# Patient Record
Sex: MALE | Race: WHITE | NOT HISPANIC OR LATINO | Employment: OTHER | URBAN - METROPOLITAN AREA
[De-identification: names, ages, dates, MRNs, and addresses within clinical notes are randomized per-mention and may not be internally consistent; named-entity substitution may affect disease eponyms.]

---

## 2017-01-23 DIAGNOSIS — I10 ESSENTIAL HYPERTENSION: ICD-10-CM

## 2017-01-23 RX ORDER — LISINOPRIL 40 MG/1
TABLET ORAL
Qty: 90 TAB | Refills: 3 | Status: SHIPPED | OUTPATIENT
Start: 2017-01-23 | End: 2018-01-29 | Stop reason: SDUPTHER

## 2017-03-21 ENCOUNTER — PATIENT OUTREACH (OUTPATIENT)
Dept: HEALTH INFORMATION MANAGEMENT | Facility: OTHER | Age: 72
End: 2017-03-21

## 2017-03-21 NOTE — PROGRESS NOTES
Attempt #:1    Verify PCP: yes    Communication Preference Obtained: yes     Annual Wellness Visit Scheduling  1. Scheduling Status:Scheduled     Care Gap Scheduling (Attempt to Schedule EACH Overdue Care Gap!)     Health Maintenance Due   Topic Date Due   • Annual Wellness Visit  SCHEDULED   • IMM DTaP/Tdap/Td Vaccine (1 - Tdap) WILL DISCUSS WITH PCP   • IMM PNEUMOCOCCAL 65+ (ADULT) LOW/MEDIUM RISK SERIES (2 of 2 - PCV13) WILL DISCUSS WITH PCP   • IMM INFLUENZA (1) WILL DISCUSS WITH PCP         MyChart Activation: declined  MyChart Lida: no  Virtual Visits: no  Opt In to Text Messages: no

## 2017-03-24 ENCOUNTER — OFFICE VISIT (OUTPATIENT)
Dept: MEDICAL GROUP | Facility: PHYSICIAN GROUP | Age: 72
End: 2017-03-24
Payer: MEDICARE

## 2017-03-24 ENCOUNTER — HOSPITAL ENCOUNTER (OUTPATIENT)
Dept: LAB | Facility: MEDICAL CENTER | Age: 72
End: 2017-03-24
Attending: INTERNAL MEDICINE
Payer: MEDICARE

## 2017-03-24 VITALS
HEIGHT: 70 IN | RESPIRATION RATE: 16 BRPM | WEIGHT: 185 LBS | OXYGEN SATURATION: 91 % | BODY MASS INDEX: 26.48 KG/M2 | HEART RATE: 78 BPM | DIASTOLIC BLOOD PRESSURE: 72 MMHG | TEMPERATURE: 97.5 F | SYSTOLIC BLOOD PRESSURE: 122 MMHG

## 2017-03-24 DIAGNOSIS — R53.82 CHRONIC FATIGUE: ICD-10-CM

## 2017-03-24 DIAGNOSIS — M54.40 LUMBAGO OF LUMBAR REGION WITH SCIATICA: ICD-10-CM

## 2017-03-24 DIAGNOSIS — E78.5 DYSLIPIDEMIA: ICD-10-CM

## 2017-03-24 DIAGNOSIS — F41.1 GENERALIZED ANXIETY DISORDER: Chronic | ICD-10-CM

## 2017-03-24 DIAGNOSIS — R10.84 GENERALIZED ABDOMINAL PAIN: ICD-10-CM

## 2017-03-24 DIAGNOSIS — E21.3 HYPERPARATHYROIDISM (HCC): ICD-10-CM

## 2017-03-24 DIAGNOSIS — E83.52 HYPERCALCEMIA: ICD-10-CM

## 2017-03-24 DIAGNOSIS — K21.00 GASTROESOPHAGEAL REFLUX DISEASE WITH ESOPHAGITIS: ICD-10-CM

## 2017-03-24 DIAGNOSIS — N20.0 KIDNEY STONES: ICD-10-CM

## 2017-03-24 LAB
25(OH)D3 SERPL-MCNC: 30 NG/ML (ref 30–100)
ALBUMIN SERPL BCP-MCNC: 4.9 G/DL (ref 3.2–4.9)
ALBUMIN/GLOB SERPL: 1.6 G/DL
ALP SERPL-CCNC: 97 U/L (ref 30–99)
ALT SERPL-CCNC: 22 U/L (ref 2–50)
ANION GAP SERPL CALC-SCNC: 4 MMOL/L (ref 0–11.9)
AST SERPL-CCNC: 22 U/L (ref 12–45)
BILIRUB SERPL-MCNC: 0.8 MG/DL (ref 0.1–1.5)
BUN SERPL-MCNC: 18 MG/DL (ref 8–22)
CALCIUM SERPL-MCNC: 10.6 MG/DL (ref 8.5–10.5)
CHLORIDE SERPL-SCNC: 106 MMOL/L (ref 96–112)
CHOLEST SERPL-MCNC: 151 MG/DL (ref 100–199)
CO2 SERPL-SCNC: 27 MMOL/L (ref 20–33)
CREAT SERPL-MCNC: 0.93 MG/DL (ref 0.5–1.4)
GLOBULIN SER CALC-MCNC: 3 G/DL (ref 1.9–3.5)
GLUCOSE SERPL-MCNC: 101 MG/DL (ref 65–99)
HDLC SERPL-MCNC: 37 MG/DL
LDLC SERPL CALC-MCNC: 95 MG/DL
POTASSIUM SERPL-SCNC: 4.3 MMOL/L (ref 3.6–5.5)
PROT SERPL-MCNC: 7.9 G/DL (ref 6–8.2)
SODIUM SERPL-SCNC: 137 MMOL/L (ref 135–145)
TRIGL SERPL-MCNC: 93 MG/DL (ref 0–149)

## 2017-03-24 PROCEDURE — 82306 VITAMIN D 25 HYDROXY: CPT | Mod: GA

## 2017-03-24 PROCEDURE — 4040F PNEUMOC VAC/ADMIN/RCVD: CPT | Performed by: NURSE PRACTITIONER

## 2017-03-24 PROCEDURE — 1101F PT FALLS ASSESS-DOCD LE1/YR: CPT | Performed by: NURSE PRACTITIONER

## 2017-03-24 PROCEDURE — 99214 OFFICE O/P EST MOD 30 MIN: CPT | Performed by: NURSE PRACTITIONER

## 2017-03-24 PROCEDURE — G8419 CALC BMI OUT NRM PARAM NOF/U: HCPCS | Performed by: NURSE PRACTITIONER

## 2017-03-24 PROCEDURE — 3017F COLORECTAL CA SCREEN DOC REV: CPT | Performed by: NURSE PRACTITIONER

## 2017-03-24 PROCEDURE — 80061 LIPID PANEL: CPT

## 2017-03-24 PROCEDURE — 1036F TOBACCO NON-USER: CPT | Performed by: NURSE PRACTITIONER

## 2017-03-24 PROCEDURE — 36415 COLL VENOUS BLD VENIPUNCTURE: CPT

## 2017-03-24 PROCEDURE — G8484 FLU IMMUNIZE NO ADMIN: HCPCS | Performed by: NURSE PRACTITIONER

## 2017-03-24 PROCEDURE — G8432 DEP SCR NOT DOC, RNG: HCPCS | Performed by: NURSE PRACTITIONER

## 2017-03-24 PROCEDURE — 80053 COMPREHEN METABOLIC PANEL: CPT

## 2017-03-24 RX ORDER — TERAZOSIN 5 MG/1
5 CAPSULE ORAL DAILY
Qty: 90 CAP | Refills: 3 | Status: SHIPPED | OUTPATIENT
Start: 2017-03-24 | End: 2018-04-03 | Stop reason: SDUPTHER

## 2017-03-24 RX ORDER — OMEPRAZOLE 20 MG/1
20 CAPSULE, DELAYED RELEASE ORAL DAILY
Qty: 90 CAP | Refills: 1 | Status: SHIPPED | OUTPATIENT
Start: 2017-03-24 | End: 2019-06-06 | Stop reason: CLARIF

## 2017-03-24 ASSESSMENT — PAIN SCALES - GENERAL: PAINLEVEL: NO PAIN

## 2017-03-24 NOTE — PATIENT INSTRUCTIONS
Start omeprazole, take on empty stomach in the morning 1/2 hour before breakfast    Get labs done before you follow up with me in 6 weeks    Avoid aggravating foods    Food Choices for Gastroesophageal Reflux Disease, Adult  When you have gastroesophageal reflux disease (GERD), the foods you eat and your eating habits are very important. Choosing the right foods can help ease the discomfort of GERD.  WHAT GENERAL GUIDELINES DO I NEED TO FOLLOW?  · Choose fruits, vegetables, whole grains, low-fat dairy products, and low-fat meat, fish, and poultry.  · Limit fats such as oils, salad dressings, butter, nuts, and avocado.  · Keep a food diary to identify foods that cause symptoms.  · Avoid foods that cause reflux. These may be different for different people.  · Eat frequent small meals instead of three large meals each day.  · Eat your meals slowly, in a relaxed setting.  · Limit fried foods.  · Cook foods using methods other than frying.  · Avoid drinking alcohol.  · Avoid drinking large amounts of liquids with your meals.  · Avoid bending over or lying down until 2-3 hours after eating.  WHAT FOODS ARE NOT RECOMMENDED?  The following are some foods and drinks that may worsen your symptoms:  Vegetables  Tomatoes. Tomato juice. Tomato and spaghetti sauce. Chili peppers. Onion and garlic. Horseradish.  Fruits  Oranges, grapefruit, and lemon (fruit and juice).  Meats  High-fat meats, fish, and poultry. This includes hot dogs, ribs, ham, sausage, salami, and lopez.  Dairy  Whole milk and chocolate milk. Sour cream. Cream. Butter. Ice cream. Cream cheese.   Beverages  Coffee and tea, with or without caffeine. Carbonated beverages or energy drinks.  Condiments  Hot sauce. Barbecue sauce.   Sweets/Desserts  Chocolate and cocoa. Donuts. Peppermint and spearmint.  Fats and Oils  High-fat foods, including French fries and potato chips.  Other  Vinegar. Strong spices, such as black pepper, white pepper, red pepper, cayenne, veronica  powder, cloves, arti, and chili powder.  The items listed above may not be a complete list of foods and beverages to avoid. Contact your dietitian for more information.     This information is not intended to replace advice given to you by your health care provider. Make sure you discuss any questions you have with your health care provider.     Document Released: 12/18/2006 Document Revised: 01/08/2016 Document Reviewed: 10/22/2014  [employer] Interactive Patient Education ©2016 [employer]

## 2017-03-24 NOTE — ASSESSMENT & PLAN NOTE
Patient does have history of hypercalcemia and parathyroid adenoma removal. We will monitor this regularly, and he is due for labs. These have been ordered and he will have done either today or on Monday. I will call him with results and further actions if needed.

## 2017-03-24 NOTE — MR AVS SNAPSHOT
"        Celio Elamcarline   3/24/2017 9:40 AM   Office Visit   MRN: [id number]    Department:  [facility]   Dept Phone:  [phone]    Description:  Male : 1945   Provider:  JANNY Pedraza           Reason for Visit     New Patient est care and lab results.       Allergies as of 3/24/2017     Allergen Noted Reactions    Xanax [Alprazolam] 2012       confusion      You were diagnosed with     Lumbago of lumbar region with sciatica   [0259480]       Hypercalcemia   [275.42.ICD-9-CM]       Generalized anxiety disorder   [300.02.ICD-9-CM]       Hyperparathyroidism (CMS-HCC)   [2719983]       Generalized abdominal pain   [290210]       Gastroesophageal reflux disease with esophagitis   [2733326]       Kidney stones   [673084]         Vital Signs     Blood Pressure Pulse Temperature Respirations Height Weight    122/72 mmHg 78 36.4 °C (97.5 °F) 16 1.778 m (5' 10\") 83.915 kg (185 lb)    Body Mass Index Oxygen Saturation Smoking Status             26.54 kg/m2 91% Former Smoker         Basic Information     Date Of Birth Sex Race Ethnicity Preferred Language    1945 Male White Non- English      Your appointments     Mar 27, 2017 10:40 AM   ANNUAL EXAM PREVENTATIVE with JANNY Davalos   [facility] (--)    [address] [address] [phone]            May 10, 2017  9:00 AM   Established Patient with JANNY Pedraza   [address] [address] [phone]           You will be receiving a confirmation call a few days before your appointment from our automated call confirmation system.              Problem List              ICD-10-CM Priority Class Noted - Resolved    Generalized anxiety disorder (Chronic) F41.1   11/10/2009 - Present    HTN (hypertension) I10   2010 - Present    Depression F32.9   2010 - Present    Chronic back pain M54.9, " G89.29   7/8/2010 - Present    Murmur, cardiac R01.1   11/4/2010 - Present    Osteopenia M85.80   3/28/2011 - Present    Seasonal allergies J30.2   5/5/2011 - Present    Kidney stones N20.0   3/26/2012 - Present    RBBB I45.10   6/20/2012 - Present    Hyperparathyroidism (CMS-[facility]) E21.3   7/2/2012 - Present    Swelling of right knee joint M25.461   6/4/2013 - Present    SOB (shortness of breath) R06.02   10/24/2014 - Present    Anemia D64.9   4/17/2015 - Present    Gastroesophageal reflux disease with esophagitis K21.0   2/1/2016 - Present    Colon polyps K63.5   2/1/2016 - Present    Lumbago of lumbar region with sciatica M54.40   3/16/2016 - Present    Grief at loss of child F43.21, Z63.4   3/16/2016 - Present    Hypercalcemia E83.52   3/16/2016 - Present    Weight loss R63.4   4/8/2016 - Present    Generalized abdominal pain R10.84   3/24/2017 - Present      Health Maintenance        Date Due Completion Dates    IMM DTaP/Tdap/Td Vaccine (1 - Tdap) 4/10/1963 ---    IMM PNEUMOCOCCAL 65+ (ADULT) LOW/MEDIUM RISK SERIES (2 of 2 - PCV13) 10/24/2015 10/24/2014    IMM INFLUENZA (1) 9/1/2016 10/24/2014    COLONOSCOPY 6/8/2026 6/8/2016            Current Immunizations     Influenza Vaccine Quad Inj (Pf) 10/24/2014 12:34 PM    Pneumococcal polysaccharide vaccine (PPSV-23) 10/24/2014 12:37 PM    SHINGLES VACCINE 5/4/2016      Below and/or attached are the medications your provider expects you to take. Review all of your home medications and newly ordered medications with your provider and/or pharmacist. Follow medication instructions as directed by your provider and/or pharmacist. Please keep your medication list with you and share with your provider. Update the information when medications are discontinued, doses are changed, or new medications (including over-the-counter products) are added; and carry medication information at all times in the event of emergency situations     Allergies:  XANAX - (reactions not documented)                Medications  Valid as of: March 24, 2017 - 10:41 AM    Generic Name Brand Name Tablet Size Instructions for use    Lisinopril (Tab) PRINIVIL, ZESTRIL 40 MG TAKE 1 TABLET BY MOUTH DAILY.        Omeprazole (CAPSULE DELAYED RELEASE) PRILOSEC 20 MG Take 1 Cap by mouth every day.        Sertraline HCl (Tab) ZOLOFT 100 MG Take 2 Tabs by mouth every day.        Terazosin HCl (Cap) HYTRIN 5 MG Take 1 Cap by mouth every day.        .                 Medicines prescribed today were sent to:     [id number] - [place], [place] - [address]    [address] [place] [address]    Phone: [phone] Fax: [phone]    Open 24 Hours?: No      Medication refill instructions:       If your prescription bottle indicates you have medication refills left, it is not necessary to call your provider’s office. Please contact your pharmacy and they will refill your medication.    If your prescription bottle indicates you do not have any refills left, you may request refills at any time through one of the following ways: The online [employer] system (except Urgent Care), by calling your provider’s office, or by asking your pharmacy to contact your provider’s office with a refill request. Medication refills are processed only during regular business hours and may not be available until the next business day. Your provider may request additional information or to have a follow-up visit with you prior to refilling your medication.   *Please Note: Medication refills are assigned a new Rx number when refilled electronically. Your pharmacy may indicate that no refills were authorized even though a new prescription for the same medication is available at the pharmacy. Please request the medicine by name with the pharmacy before contacting your provider for a refill.        Instructions    Start omeprazole, take on empty stomach in the morning 1/2 hour before breakfast    Get labs done before you follow up with me  in 6 weeks    Avoid aggravating foods    Food Choices for Gastroesophageal Reflux Disease, Adult  When you have gastroesophageal reflux disease (GERD), the foods you eat and your eating habits are very important. Choosing the right foods can help ease the discomfort of GERD.  WHAT GENERAL GUIDELINES DO I NEED TO FOLLOW?  · Choose fruits, vegetables, whole grains, low-fat dairy products, and low-fat meat, fish, and poultry.  · Limit fats such as oils, salad dressings, butter, nuts, and avocado.  · Keep a food diary to identify foods that cause symptoms.  · Avoid foods that cause reflux. These may be different for different people.  · Eat frequent small meals instead of three large meals each day.  · Eat your meals slowly, in a relaxed setting.  · Limit fried foods.  · Cook foods using methods other than frying.  · Avoid drinking alcohol.  · Avoid drinking large amounts of liquids with your meals.  · Avoid bending over or lying down until 2-3 hours after eating.  WHAT FOODS ARE NOT RECOMMENDED?  The following are some foods and drinks that may worsen your symptoms:  Vegetables  Tomatoes. Tomato juice. Tomato and spaghetti sauce. Chili peppers. Onion and garlic. Horseradish.  Fruits  Oranges, grapefruit, and lemon (fruit and juice).  Meats  High-fat meats, fish, and poultry. This includes hot dogs, ribs, ham, sausage, salami, and lopez.  Dairy  Whole milk and chocolate milk. Sour cream. Cream. Butter. Ice cream. Cream cheese.   Beverages  Coffee and tea, with or without caffeine. Carbonated beverages or energy drinks.  Condiments  Hot sauce. Barbecue sauce.   Sweets/Desserts  Chocolate and cocoa. Donuts. Peppermint and spearmint.  Fats and Oils  High-fat foods, including French fries and potato chips.  Other  Vinegar. Strong spices, such as black pepper, white pepper, red pepper, cayenne, veronica powder, cloves, ginger, and chili powder.  The items listed above may not be a complete list of foods and beverages to avoid.  Contact your dietitian for more information.     This information is not intended to replace advice given to you by your health care provider. Make sure you discuss any questions you have with your health care provider.     Document Released: 12/18/2006 Document Revised: 01/08/2016 Document Reviewed: 10/22/2014  [employer] Interactive Patient Education ©2016 [employer]             Other Notes About Your Plan     Dr. Cruz-Urology  Dr. Vásquez-Neuro           Picwing Access Code: [email or web address]  Expires: 4/23/2017 10:37 AM    Picwing  A secure, online tool to manage your health information     [employer]’s Picwing® is a secure, online tool that connects you to your personalized health information from the privacy of your home -- day or night - making it very easy for you to manage your healthcare. Once the activation process is completed, you can even access your medical information using the Picwing lida, which is available for free in the [employer] Lida store or Google Play store.     Picwing provides the following levels of access (as shown below):   My Chart Features   [facility] Doctor [facility]  Specialists [facility]  Urgent  Care Non-[facility]  Primary Care  Doctor   Email your healthcare team securely and privately 24/7 X X X    Manage appointments: schedule your next appointment; view details of past/upcoming appointments X      Request prescription refills. X      View recent personal medical records, including lab and immunizations X X X X   View health record, including health history, allergies, medications X X X X   Read reports about your outpatient visits, procedures, consult and ER notes X X X X   See your discharge summary, which is a recap of your hospital and/or ER visit that includes your diagnosis, lab results, and care plan. X X       How to register for Picwing:  1. Go to  [email or web address].  2. Click on the Sign Up Now box, which takes you to the New Member Sign Up page. You will need  to provide the following information:  a. Enter your [employer] Access Code exactly as it appears at the top of this page. (You will not need to use this code after you’ve completed the sign-up process. If you do not sign up before the expiration date, you must request a new code.)   b. Enter your date of birth.   c. Enter your home email address.   d. Click Submit, and follow the next screen’s instructions.  3. Create a [employer] ID. This will be your [employer] login ID and cannot be changed, so think of one that is secure and easy to remember.  4. Create a [employer] password. You can change your password at any time.  5. Enter your Password Reset Question and Answer. This can be used at a later time if you forget your password.   6. Enter your e-mail address. This allows you to receive e-mail notifications when new information is available in [employer].  7. Click Sign Up. You can now view your health information.    For assistance activating your [employer] account, call [phone]

## 2017-03-24 NOTE — ASSESSMENT & PLAN NOTE
Acute in nature, started about 2-3 weeks ago. Located in the epigastric area and sometimes around the umbilicus. He is have regular BMs, no blood in stool, no nausea or vomiting, no heartburn. He cannot pinpoint what aggravates it or makes it better, notices it more in the evening. He feels that it comes and goes.   I did discuss with him that generalized abdominal pain can be caused by many things, but his symptoms are likely caused from GERD. I reminded him that he was treated for this last year with omeprazole. He admits to never picking up the prescription and taking it. I encouraged him to start taking this medication, I will call in new prescription. I also gave him list of foods and activities that he should avoid, printed educational material was given to him. I will see him back and 6 weeks for follow-up.

## 2017-03-24 NOTE — ASSESSMENT & PLAN NOTE
This is chronic in nature, stable. At his last visit he was referred to back to his back surgeon but admits to not making the appointment. He states overall his back pain has improved.

## 2017-03-24 NOTE — PROGRESS NOTES
Chief Complaint   Patient presents with   • New Patient     est care and lab results.          This is a 71 y.o.male patient that presents today with the following: Follow-up visit, review labs--however patient did not have these done, he will have them done either today or sometime next week.    Generalized abdominal pain  Acute in nature, started about 2-3 weeks ago. Located in the epigastric area and sometimes around the umbilicus. He is have regular BMs, no blood in stool, no nausea or vomiting, no heartburn. He cannot pinpoint what aggravates it or makes it better, notices it more in the evening. He feels that it comes and goes.   I did discuss with him that generalized abdominal pain can be caused by many things, but his symptoms are likely caused from GERD. I reminded him that he was treated for this last year with omeprazole. He admits to never picking up the prescription and taking it. I encouraged him to start taking this medication, I will call in new prescription. I also gave him list of foods and activities that he should avoid, printed educational material was given to him. I will see him back and 6 weeks for follow-up.      Generalized Anxiety Disorder  This is a chronic condition, stable and well controlled on sertraline, 200 mg daily. He tolerates this well with no significant bothersome side effects. He does not need refills at this time, but can call when needed. He denies suicidal and homicidal ideations, racing thoughts and flights of ideas. We will monitor this every 6 months and as needed.    Kidney stones  Patient does have past history of kidney stones for which she takes terazosin 5 mg daily. He tolerates this well with no significant or bothersome side effects. He does not report recent history of kidney stones. He does need refills, this was called in for him.    Hypercalcemia  Patient does have history of hypercalcemia and parathyroid adenoma removal. We will monitor this regularly, and  "he is due for labs. These have been ordered and he will have done either today or on Monday. I will call him with results and further actions if needed.    Lumbago of lumbar region with sciatica  This is chronic in nature, stable. At his last visit he was referred to back to his back surgeon but admits to not making the appointment. He states overall his back pain has improved.      No visits with results within 1 Month(s) from this visit.  Latest known visit with results is:    Hospital Outpatient Visit on 04/26/2016   Component Date Value   • Prostatic Specific Antig* 04/26/2016 0.81          clinical course has been stable    Past Medical History   Diagnosis Date   • Essential hypertension, malignant    • Hypertrophy of prostate with urinary obstruction and other lower urinary tract symptoms (LUTS)    • Insomnia, unspecified    • Right bundle branch block and left anterior fascicular block    • Ingrowing nail      RT GREAT TOE   • Degeneration of lumbar or lumbosacral intervertebral disc    • Allergic rhinitis, cause unspecified    • Unspecified deficiency anemia    • Depressive disorder, not elsewhere classified    • Anxiety state, unspecified    • Esophageal reflux    • Generalized anxiety disorder 11/10/2009   • Unspecified hemorrhagic conditions (CMS-HCC)      nose bleeds   • History of kidney stones    • SOB (shortness of breath) 10/24/2014       Past Surgical History   Procedure Laterality Date   • Lumbar laminectomy diskectomy  2009   • Cervical disk and fusion anterior  2009   • Lithotripsy       \"several years ago\"   • Inguinal hernia repair       left   • Parathyroid exploration  7/25/2012     Performed by GURVINDER KENDALL at SURGERY SAME DAY [facility] ORS       Family History   Problem Relation Age of Onset   • Cancer Mother      LIVER       Xanax    Current Outpatient Prescriptions Ordered in [facility]   Medication Sig Dispense Refill   • omeprazole (PRILOSEC) 20 MG delayed-release capsule Take 1 Cap by " "mouth every day. 90 Cap 1   • terazosin (HYTRIN) 5 MG Cap Take 1 Cap by mouth every day. 90 Cap 3   • lisinopril (PRINIVIL, ZESTRIL) 40 MG tablet TAKE 1 TABLET BY MOUTH DAILY. 90 Tab 3   • sertraline (ZOLOFT) 100 MG Tab Take 2 Tabs by mouth every day. 180 Tab 3     No current Epic-ordered facility-administered medications on file.       Constitutional ROS: No unexpected change in weight, No weakness, No unexplained fevers, sweats, or chills  Neck ROS: No lumps or masses, No swollen glands, No significant pain in neck  Pulmonary ROS: No chronic cough, sputum, or hemoptysis, No shortness of breath, No recent change in breathing  Cardiovascular ROS: No chest pain, No edema, No palpitations  Gastrointestinal ROS: Positive per history of present illness  Musculoskeletal/Extremities ROS: Positive for chronic low back pain  Neurologic ROS: Normal development, No chronic headaches, No weakness  Psychiatric ROS: Positive for anxiety and depression    Physical exam:  /72 mmHg  Pulse 78  Temp(Src) 36.4 °C (97.5 °F)  Resp 16  Ht 1.778 m (5' 10\")  Wt 83.915 kg (185 lb)  BMI 26.54 kg/m2  SpO2 91%  General Appearance: Elderly male, alert, no distress, mildly overweight, well-groomed  Skin: Skin color, texture, turgor normal. No rashes or lesions.  Lungs: negative findings: normal respiratory rate and rhythm, lungs clear to auscultation  Heart: negative. RRR without murmur, gallop, or rubs.  No ectopy.  Abdomen: Abdomen soft, BS normal. No masses,  No organomegaly. Mild TTP to epigastric area  Musculoskeletal: positive findings: Decreased range of motion of lumbar spine due to pain  Neurologic: intact, oriented, mood appropriate, judgment intact. Cranial nerves II-12 grossly intact    Medical decision making/discussion: Patient here for follow-up visit and to review labs. Unfortunately he did not have his labs done, so he will have them done either today or sometime next week. I will call him with results and further " actions if needed. He is also to follow-up with me in 6 weeks, we can discuss at that time as well. I will have him restart omeprazole for his epigastric pain and avoid aggravating foods and activities--educational material was given to him. He is to continue on his other medications as previously prescribed.    Celio was seen today for new patient.    Diagnoses and all orders for this visit:    Lumbago of lumbar region with sciatica    Hypercalcemia    Generalized anxiety disorder    Hyperparathyroidism (CMS-HCC)    Generalized abdominal pain    Gastroesophageal reflux disease with esophagitis  -     omeprazole (PRILOSEC) 20 MG delayed-release capsule; Take 1 Cap by mouth every day.    Kidney stones  -     terazosin (HYTRIN) 5 MG Cap; Take 1 Cap by mouth every day.          Please note that this dictation was created using voice recognition software. I have made every reasonable attempt to correct obvious errors, but I expect that there are errors of grammar and possibly content that I did not discover before finalizing the note.

## 2017-03-24 NOTE — ASSESSMENT & PLAN NOTE
Patient does have past history of kidney stones for which she takes terazosin 5 mg daily. He tolerates this well with no significant or bothersome side effects. He does not report recent history of kidney stones. He does need refills, this was called in for him.

## 2017-03-24 NOTE — ASSESSMENT & PLAN NOTE
This is a chronic condition, stable and well controlled on sertraline, 200 mg daily. He tolerates this well with no significant bothersome side effects. He does not need refills at this time, but can call when needed. He denies suicidal and homicidal ideations, racing thoughts and flights of ideas. We will monitor this every 6 months and as needed.

## 2017-03-27 ENCOUNTER — OFFICE VISIT (OUTPATIENT)
Dept: MEDICAL GROUP | Facility: PHYSICIAN GROUP | Age: 72
End: 2017-03-27
Payer: MEDICARE

## 2017-03-27 VITALS
DIASTOLIC BLOOD PRESSURE: 78 MMHG | OXYGEN SATURATION: 93 % | WEIGHT: 188.5 LBS | BODY MASS INDEX: 26.99 KG/M2 | TEMPERATURE: 97.5 F | SYSTOLIC BLOOD PRESSURE: 122 MMHG | HEART RATE: 89 BPM | HEIGHT: 70 IN

## 2017-03-27 DIAGNOSIS — I45.10 RBBB: ICD-10-CM

## 2017-03-27 DIAGNOSIS — Z63.4 GRIEF AT LOSS OF CHILD: ICD-10-CM

## 2017-03-27 DIAGNOSIS — F41.1 GENERALIZED ANXIETY DISORDER: Chronic | ICD-10-CM

## 2017-03-27 DIAGNOSIS — F43.21 GRIEF AT LOSS OF CHILD: ICD-10-CM

## 2017-03-27 DIAGNOSIS — M85.80 OSTEOPENIA: ICD-10-CM

## 2017-03-27 DIAGNOSIS — M25.461 SWELLING OF RIGHT KNEE JOINT: ICD-10-CM

## 2017-03-27 DIAGNOSIS — E21.3 HYPERPARATHYROIDISM (HCC): ICD-10-CM

## 2017-03-27 DIAGNOSIS — K21.00 GASTROESOPHAGEAL REFLUX DISEASE WITH ESOPHAGITIS: ICD-10-CM

## 2017-03-27 DIAGNOSIS — K63.5 COLON POLYPS: ICD-10-CM

## 2017-03-27 DIAGNOSIS — E83.52 HYPERCALCEMIA: ICD-10-CM

## 2017-03-27 DIAGNOSIS — R63.4 WEIGHT LOSS: ICD-10-CM

## 2017-03-27 DIAGNOSIS — R01.1 MURMUR, CARDIAC: ICD-10-CM

## 2017-03-27 DIAGNOSIS — N20.0 KIDNEY STONES: ICD-10-CM

## 2017-03-27 DIAGNOSIS — M54.40 LUMBAGO OF LUMBAR REGION WITH SCIATICA: ICD-10-CM

## 2017-03-27 DIAGNOSIS — R06.02 SOB (SHORTNESS OF BREATH): ICD-10-CM

## 2017-03-27 DIAGNOSIS — Z00.00 MEDICARE ANNUAL WELLNESS VISIT, INITIAL: Primary | ICD-10-CM

## 2017-03-27 PROCEDURE — G0438 PPPS, INITIAL VISIT: HCPCS | Performed by: NURSE PRACTITIONER

## 2017-03-27 PROCEDURE — G8510 SCR DEP NEG, NO PLAN REQD: HCPCS | Performed by: NURSE PRACTITIONER

## 2017-03-27 PROCEDURE — 1036F TOBACCO NON-USER: CPT | Performed by: NURSE PRACTITIONER

## 2017-03-27 SDOH — SOCIAL STABILITY - SOCIAL INSECURITY: DISSAPEARANCE AND DEATH OF FAMILY MEMBER: Z63.4

## 2017-03-27 ASSESSMENT — PATIENT HEALTH QUESTIONNAIRE - PHQ9
5. POOR APPETITE OR OVEREATING: 3 - NEARLY EVERY DAY
SUM OF ALL RESPONSES TO PHQ QUESTIONS 1-9: 2
CLINICAL INTERPRETATION OF PHQ2 SCORE: 2

## 2017-03-27 NOTE — PROGRESS NOTES
CC:   Medicare Annual Wellness Visit    HPI:  Celio is a 71 y.o. here for Medicare Annual Wellness Visit    Patient Active Problem List    Diagnosis Date Noted   • Generalized abdominal pain 03/24/2017   • Weight loss 04/08/2016   • Lumbago of lumbar region with sciatica 03/16/2016   • Grief at loss of child 03/16/2016   • Hypercalcemia 03/16/2016   • Gastroesophageal reflux disease with esophagitis 02/01/2016   • Colon polyps 02/01/2016   • Anemia 04/17/2015   • SOB (shortness of breath) 10/24/2014   • Hyperparathyroidism (CMS-HCC) 07/02/2012   • RBBB 06/20/2012   • Kidney stones 03/26/2012   • Seasonal allergies 05/05/2011   • Osteopenia 03/28/2011   • Murmur, cardiac 11/04/2010   • Depression 07/08/2010   • Chronic back pain 07/08/2010   • HTN (hypertension) 06/30/2010   • Generalized anxiety disorder 11/10/2009     Current Outpatient Prescriptions   Medication Sig Dispense Refill   • Multiple Vitamins-Minerals (CENTRUM SILVER ADULT 50+ PO) Take  by mouth.     • omeprazole (PRILOSEC) 20 MG delayed-release capsule Take 1 Cap by mouth every day. 90 Cap 1   • terazosin (HYTRIN) 5 MG Cap Take 1 Cap by mouth every day. 90 Cap 3   • lisinopril (PRINIVIL, ZESTRIL) 40 MG tablet TAKE 1 TABLET BY MOUTH DAILY. 90 Tab 3   • sertraline (ZOLOFT) 100 MG Tab Take 2 Tabs by mouth every day. 180 Tab 3     No current facility-administered medications for this visit.      Current supplements: no  Chronic narcotic pain medicines: no  Allergies: Xanax  Exercise: as maurice  Current social contact/activities: Has support of family and friends      Screening:  Depression Screening    Little interest or pleasure in doing things?  0 - not at all  Feeling down, depressed , or hopeless? 2 - more than half the days  Trouble falling or staying asleep, or sleeping too much?  3 - nearly every day  Feeling tired or having little energy?  2 - more than half the days  Poor appetite or overeating?  3 - nearly every day  Feeling bad about yourself -  or that you are a failure or have let yourself or your family down? 1 - several days  Trouble concentrating on things, such as reading the newspaper or watching television? 0 - not at all  Moving or speaking so slowly that other people could have noticed.  Or the opposite - being so fidgety or restless that you have been moving around a lot more than usual?  2 - more than half the days  Thoughts that you would be better off dead, or of hurting yourself?  0 - not at all  Patient Health Questionnaire Score: 2  If depressive symptoms identified deferred to follow up visit unless specifically addressed in assessment and plan.      Screening for Cognitive Impairment    Three Minute Recall (banana, sunrise, fence)  3/3    Draw clock face with all 12 numbers set to the hand to show 10 minures past 11 o'clock  1 5  If cognitive concerns identified deferred to follow up visit unless specifically addressed in assessment and plan.    Fall Risk Assessment    Has the patient had two or more falls in the last year or any fall with injury in the last year?  No  If Fall Risk identified deferred to follow up visit unless specifically addressed in assessment and plan.    Safety Assessment    Throw rugs on floor.  Yes  Handrails on all stairs.  Yes  Good lighting in all hallways.  Yes  Difficulty hearing.  No  Patient counseled about all safety risks that were identified.    Functional Assessment ADLs    Are there any barriers preventing you from cooking for yourself or meeting nutritional needs?  No.    Are there any barriers preventing you from driving safely or obtaining transportation?  No.    Are there any barriers preventing you from using a telephone or calling for help?  No.    Are there any barriers preventing you from shopping?  No.     Are there any barriers preventing you from taking care of your own finances?  Yes. Daughter manages finances.    Are there any barriers preventing you from managing your medications?  No.   "  Are currently engaing any exercise or physical activity?  Yes.       Health Maintenance Summary                Annual Wellness Visit Overdue 1945     IMM DTaP/Tdap/Td Vaccine Overdue 4/10/1963     IMM PNEUMOCOCCAL 65+ (ADULT) LOW/MEDIUM RISK SERIES Overdue 10/24/2015      Done 10/24/2014 Imm Admin: Pneumococcal polysaccharide vaccine (PPSV-23)    IMM INFLUENZA Overdue 9/1/2016      Done 10/24/2014 Imm Admin: Influenza Vaccine Quad Inj (Pf)    COLONOSCOPY Next Due 6/8/2026      Done 6/8/2016 AMB REFERRAL TO GI FOR COLONOSCOPY          Patient Care Team:  JANNY Pedraza as PCP - General (Family Medicine)  Familia Cruz M.D. as Consulting Physician (Urology)  Saurabh Vásquez M.D. as Consulting Physician (Neurosurgery)        Social History   Substance Use Topics   • Smoking status: Former Smoker -- 0.50 packs/day for 30 years     Types: Cigarettes     Quit date: 03/27/1987   • Smokeless tobacco: Never Used   • Alcohol Use: No       Family History   Problem Relation Age of Onset   • Cancer Mother      LIVER   • No Known Problems Father      He  has a past medical history of Essential hypertension, malignant; Hypertrophy of prostate with urinary obstruction and other lower urinary tract symptoms (LUTS); Insomnia, unspecified; Right bundle branch block and left anterior fascicular block; Ingrowing nail; Degeneration of lumbar or lumbosacral intervertebral disc; Allergic rhinitis, cause unspecified; Unspecified deficiency anemia; Depressive disorder, not elsewhere classified; Anxiety state, unspecified; Esophageal reflux; Generalized anxiety disorder (11/10/2009); Unspecified hemorrhagic conditions (CMS-HCC); History of kidney stones; and SOB (shortness of breath) (10/24/2014).   Past Surgical History   Procedure Laterality Date   • Lumbar laminectomy diskectomy  2009   • Cervical disk and fusion anterior  2009   • Lithotripsy       \"several years ago\"   • Inguinal hernia repair       left   • " "Parathyroid exploration  7/25/2012     Performed by GURVINDER KENDALL at SURGERY SAME DAY [facility]S       ROS:    Ostomy or other tubes or amputations: no  Chronic oxygen use no  Last eye exam 2/2017  : Denies incontinence.   Gait: Uses no assistive device   Hearing excellent.    Dentition good    Exam: Blood pressure 122/78, pulse 89, temperature 36.4 °C (97.5 °F), height 1.765 m (5' 9.5\"), weight 85.503 kg (188 lb 8 oz), SpO2 93 %. Body mass index is 27.45 kg/(m^2).  Alert, oriented in no acute distress.  Eye contact is good, speech goal directed, affect calm      Assessment and Plan. The following treatment and monitoring plan is recommended for all problems as listed below:   Weight loss  Today wgt 285, prior 188  Followed by JANNY Pedraza.     Seasonal allergies  Chronic condition managed with current medical regimen  Stable per review   Continue with OTC prn meds  Followed by VIJAY PedrazaRSWATHI.        Swelling of right knee joint  resolved    SOB (shortness of breath)  Chronic condition managed with current medical regimen  Stable per review, rare and intermit   Continue with current meds  Followed by JANNY Pedraza.        RBBB  Followed by JANNY Pedraza.     Osteopenia  Chronic condition managed with current medical regimen  Stable per review   Continue with current OTC meds  Followed by JANNY Pedraza.        Murmur, cardiac  Followed by JANNY Pedraza.     Lumbago of lumbar region with sciatica  Chronic condition managed with current medical regimen  Stable per review   Continue with surviellance  Followed by JANNY Pedraza.        Hyperparathyroidism  2/2016   Pth, Intact 64.3 14.0 - 72.0 pg/mL Final       States had surg in past, unsure if all 4 was removed  Followed by VIJAY PedrazaRSWATHI.          HTN (hypertension)  /78  Chronic condition managed with current medical regimen  Stable per review   " Continue with current meds  Followed by JANNY Pedraza.        Kidney stones  Chronic condition managed with current medical regimen  Stable per review, last event ~ 2yrs ago   Continue with current meds  Followed by urologist       Hypercalcemia  3/24/2017   Calcium 10.6 (H) 8.5 - 10.5 mg/dL Final     Followed by JANNY Pedraza.                   Grief at loss of child  Lost son, suicide, cont to deal with his loss   Continue with current meds  Followed by JANNY Pedraza.     Generalized Anxiety Disorder  Chronic condition managed with current medical regimen  Stable per review   Continue with surveillance  Followed by JANNY Pedraza.        Gastroesophageal reflux disease with esophagitis  Chronic condition managed with current medical regimen  Stable per review   Continue with current meds  Followed by JANNY Pedraza.        Depression  Lost son, suicide, cont to deal with his loss   Continue with current meds  Followed by JANNY Pedraza.       Colon polyps  Neg path    Chronic back pain  Chronic condition managed with current medical regimen  Stable per review   Continue with surveillance  Followed by JANNY Pedraza.        Anemia  4/13/2015   RBC 4.33 (L) 4.70 - 6.10 M/uL Final      Hemoglobin 13.4 (L) 14.0 - 18.0 g/dL Final     Hematocrit 40.7 (L) 42.0 - 52.0 % Final     MCV 94.0 81.4 - 97.8 fL Final     MCH 30.9 27.0 - 33.0 pg Final     MCHC 32.9 (L) 33.7 - 35.3 g/dL Final     RDW 48.9 35.9 - 50.0 fL Final     Platelet Count 139 (L) 164 - 446 K/uL Final     Chronic condition managed with current medical regimen  Stable per review   Continue with current otc meds  Followed by JANNY Pedraza.            Health Care Screening recommendations reviewed with patient today: per Patient Instructions  DPA/Advanced directive: .has an advanced directive - recom a copy be provided    Next office visit for recheck of chronic  medical conditions is due with JANNY Pedraza 5/10/2017

## 2017-03-27 NOTE — ASSESSMENT & PLAN NOTE
4/13/2015   RBC 4.33 (L) 4.70 - 6.10 M/uL Final      Hemoglobin 13.4 (L) 14.0 - 18.0 g/dL Final     Hematocrit 40.7 (L) 42.0 - 52.0 % Final     MCV 94.0 81.4 - 97.8 fL Final     MCH 30.9 27.0 - 33.0 pg Final     MCHC 32.9 (L) 33.7 - 35.3 g/dL Final     RDW 48.9 35.9 - 50.0 fL Final     Platelet Count 139 (L) 164 - 446 K/uL Final     Chronic condition managed with current medical regimen  Stable per review   Continue with current otc meds  Followed by JANNY Pedraza.

## 2017-03-27 NOTE — ASSESSMENT & PLAN NOTE
Chronic condition managed with current medical regimen  Stable per review, rare and intermit   Continue with current meds  Followed by JANNY Pedraza.

## 2017-03-27 NOTE — ASSESSMENT & PLAN NOTE
Chronic condition managed with current medical regimen  Stable per review, last event ~ 2yrs ago   Continue with current meds  Followed by urologist

## 2017-03-27 NOTE — MR AVS SNAPSHOT
"        Celio Carringtondeborah   3/27/2017 10:40 AM   Office Visit   MRN: [id number]    Department:  [facility]   Dept Phone:  [phone]    Description:  Male : 1945   Provider:  JANNY Davalos           Reason for Visit     Annual Exam initial      Allergies as of 3/27/2017     Allergen Noted Reactions    Xanax [Alprazolam] 2012       confusion      You were diagnosed with     Medicare annual wellness visit, initial   [910598]  -  Primary     Weight loss   [341697]       Swelling of right knee joint   [595093]       SOB (shortness of breath)   [869815]       RBBB   [320823]       Osteopenia   [342408]       Murmur, cardiac   [392772]       Lumbago of lumbar region with sciatica   [4181594]       Hyperparathyroidism (CMS-HCC)   [5965311]       Kidney stones   [543187]       Hypercalcemia   [275.42.ICD-9-CM]       Grief at loss of child   [244399]       Generalized anxiety disorder   [300.02.ICD-9-CM]       Gastroesophageal reflux disease with esophagitis   [8910142]       Colon polyps   [407997]         Vital Signs     Blood Pressure Pulse Temperature Height Weight Body Mass Index    122/78 mmHg 89 36.4 °C (97.5 °F) 1.765 m (5' 9.5\") 85.503 kg (188 lb 8 oz) 27.45 kg/m2    Oxygen Saturation Smoking Status                93% Former Smoker          Basic Information     Date Of Birth Sex Race Ethnicity Preferred Language    1945 Male White Non- English      Your appointments     May 10, 2017  9:00 AM   Established Patient with JANNY Pedraza   [address] [address] [phone]           You will be receiving a confirmation call a few days before your appointment from our automated call confirmation system.              Problem List              ICD-10-CM Priority Class Noted - Resolved    Generalized anxiety disorder (Chronic) F41.1   11/10/2009 - Present    HTN (hypertension) I10   2010 - " Present    Depression F32.9   7/8/2010 - Present    Chronic back pain M54.9, G89.29   7/8/2010 - Present    Murmur, cardiac R01.1   11/4/2010 - Present    Osteopenia M85.80   3/28/2011 - Present    Seasonal allergies J30.2   5/5/2011 - Present    Kidney stones N20.0   3/26/2012 - Present    RBBB I45.10   6/20/2012 - Present    Hyperparathyroidism (CMS-HCC) E21.3   7/2/2012 - Present    SOB (shortness of breath) R06.02   10/24/2014 - Present    Anemia D64.9   4/17/2015 - Present    Gastroesophageal reflux disease with esophagitis K21.0   2/1/2016 - Present    Colon polyps K63.5   2/1/2016 - Present    Lumbago of lumbar region with sciatica M54.40   3/16/2016 - Present    Grief at loss of child F43.21, Z63.4   3/16/2016 - Present    Hypercalcemia E83.52   3/16/2016 - Present    Weight loss R63.4   4/8/2016 - Present    Generalized abdominal pain R10.84   3/24/2017 - Present      Health Maintenance        Date Due Completion Dates    IMM DTaP/Tdap/Td Vaccine (1 - Tdap) 4/10/1963 ---    IMM PNEUMOCOCCAL 65+ (ADULT) LOW/MEDIUM RISK SERIES (2 of 2 - PCV13) 10/24/2015 10/24/2014    IMM INFLUENZA (1) 9/1/2016 10/24/2014    COLONOSCOPY 6/8/2026 6/8/2016            Current Immunizations     Influenza Vaccine Quad Inj (Pf) 10/24/2014 12:34 PM    Pneumococcal polysaccharide vaccine (PPSV-23) 10/24/2014 12:37 PM    SHINGLES VACCINE 5/4/2016      Below and/or attached are the medications your provider expects you to take. Review all of your home medications and newly ordered medications with your provider and/or pharmacist. Follow medication instructions as directed by your provider and/or pharmacist. Please keep your medication list with you and share with your provider. Update the information when medications are discontinued, doses are changed, or new medications (including over-the-counter products) are added; and carry medication information at all times in the event of emergency situations     Allergies:  XANAX - (reactions  not documented)               Medications  Valid as of: March 27, 2017 -  1:06 PM    Generic Name Brand Name Tablet Size Instructions for use    Lisinopril (Tab) PRINIVIL, ZESTRIL 40 MG TAKE 1 TABLET BY MOUTH DAILY.        Multiple Vitamins-Minerals   Take  by mouth.        Omeprazole (CAPSULE DELAYED RELEASE) PRILOSEC 20 MG Take 1 Cap by mouth every day.        Sertraline HCl (Tab) ZOLOFT 100 MG Take 2 Tabs by mouth every day.        Terazosin HCl (Cap) HYTRIN 5 MG Take 1 Cap by mouth every day.        .                 Medicines prescribed today were sent to:     [id number] [facility], [place] - [address]    [address] [place] [address]    Phone: [phone] Fax: [phone]    Open 24 Hours?: No      Medication refill instructions:       If your prescription bottle indicates you have medication refills left, it is not necessary to call your provider’s office. Please contact your pharmacy and they will refill your medication.    If your prescription bottle indicates you do not have any refills left, you may request refills at any time through one of the following ways: The online [employer] system (except Urgent Care), by calling your provider’s office, or by asking your pharmacy to contact your provider’s office with a refill request. Medication refills are processed only during regular business hours and may not be available until the next business day. Your provider may request additional information or to have a follow-up visit with you prior to refilling your medication.   *Please Note: Medication refills are assigned a new Rx number when refilled electronically. Your pharmacy may indicate that no refills were authorized even though a new prescription for the same medication is available at the pharmacy. Please request the medicine by name with the pharmacy before contacting your provider for a refill.        Instructions    Continue with care through JANNY Pedraza.   Next Medicare Annual Wellness Visit is due in one year.    Continue care with specialists you are seeing for your chronic problems.    Attached is some preventative information for you to read.          Fall Prevention and Home Safety  Falls cause injuries and can affect all age groups. It is possible to prevent falls.   HOW TO PREVENT FALLS  · Wear shoes with rubber soles that do not have an opening for your toes.   · Keep the inside and outside of your house well lit.   · Use night lights throughout your home.   · Remove clutter from floors.   · Clean up floor spills.   · Remove throw rugs or fasten them to the floor with carpet tape.   · Do not place electrical cords across pathways.   · Put grab bars by your tub, shower, and toilet. Do not use towel bars as grab bars.   · Put handrails on both sides of the stairway. Fix loose handrails.   · Do not climb on stools or stepladders, if possible.   · Do not wax your floors.   · Repair uneven or unsafe sidewalks, walkways, or stairs.   · Keep items you use a lot within reach.   · Be aware of pets.   · Keep emergency numbers next to the telephone.   · Put smoke detectors in your home and near bedrooms.   Ask your doctor what other things you can do to prevent falls.  Document Released: 10/14/2010 Document Revised: 06/18/2013 Document Reviewed: 03/19/2013  ExitCare® Patient Information ©2013 [employer].    Exercise to Stay Healthy      Exercise helps you become and stay healthy.  EXERCISE IDEAS AND TIPS  Choose exercises that:  · You enjoy.   · Fit into your day.   You do not need to exercise really hard to be healthy. You can do exercises at a slow or medium level and stay healthy. You can:  · Stretch before and after working out.   · Try yoga, Pilates, or dorina chi.   · Lift weights.   · Walk fast, swim, jog, run, climb stairs, bicycle, dance, or rollerskate.   · Take aerobic classes.   Exercises that burn about 150 calories:  · Running 1 ½ miles in 15 minutes.    · Playing volleyball for 45 to 60 minutes.   · Washing and waxing a car for 45 to 60 minutes.   · Playing touch football for 45 minutes.   · Walking 1 ¾ miles in 35 minutes.   · Pushing a stroller 1 ½ miles in 30 minutes.   · Playing basketball for 30 minutes.   · Raking leaves for 30 minutes.   · Bicycling 5 miles in 30 minutes.   · Walking 2 miles in 30 minutes.   · Dancing for 30 minutes.   · Shoveling snow for 15 minutes.   · Swimming laps for 20 minutes.   · Walking up stairs for 15 minutes.   · Bicycling 4 miles in 15 minutes.   · Gardening for 30 to 45 minutes.   · Jumping rope for 15 minutes.   · Washing windows or floors for 45 to 60 minutes.     One suggestion is to start walking for 10 minutes after each meal.  This will help with digestion and help you to metabolize your meal.       For Weight Loss -   Recommend portion sizes with more fruits/vegetables/high fiber foods.  Stay away from white bread/pastas/white rice/white potatoes.  Increase Fluid intake to 6-8 glasses of water daily.   Eliminate soda's (diet and regular) from your fluid intake.      Document Released: 01/20/2012 Document Revised: 03/11/2013 Document Reviewed: 01/20/2012  ExitCare® Patient Information ©2013 [employer].    Fat and Cholesterol Control Diet  Cholesterol is a wax-like substance. It comes from your liver and is found in certain foods. There is good (HDL) and bad (LDL) cholesterol. Too much cholesterol in your blood can affect your heart. Certain foods can lower or raise your cholesterol. Eat foods that are low in cholesterol.  Saturated and trans fats are bad fats found in foods that will raise your cholesterol. Do not eat foods that are high in saturated and trans fats.  FOODS HIGHER IN SATURATED AND TRANS FATS  · Dairy products, such as whole milk, eggs, cheese, cream, and butter.   · Fatty meats, such as hot dogs, sausage, and salami.   · Fried foods.   · Trans fats which are found in margarine and pre-made cookies,  crackers, and baked goods.   · Tropical oils, such as coconut and palm oils.   Read package labels at the store. Do not buy products that use saturated or trans fats or hydrogenated oils. Find foods labeled:  · Low-fat.   · Low-saturated fat.   · Trans-fat-free.   · Low-cholesterol.   FOODS LOWER IN CHOLESTEROL  ·  Fruit.   · Vegetables.   · Beans, peas, and lentils.   · Fish.   · Lean meat, such as chicken (without skin) or ground turkey.   · Grains, such as barley, rice, couscous, bulgur wheat, and pasta.   · Heart-healthy tub margarine.   PREPARING YOUR FOOD  · Broil, bake, steam, or roast foods. Do not olsen food.   · Use non-stick cooking sprays.   · Use lemon or herbs to flavor food instead of using butter or stick margarine.   · Use nonfat yogurt, salsa, or low-fat dressings for salads.   Document Released: 06/18/2013 Document Reviewed: 06/18/2013  ExitCare® Patient Information ©2013 [employer].    Recommend annual flu vaccine.  Next due in Fall, 2015.  If you decide not to have the flu vaccine, recommend good handwashing and staying out of crowds during flu season.                   Other Notes About Your Plan     Dr. Cruz-Urology  Dr. Vásquez-Neuro           MyChart Access Code: Activation code not generated  Current MyChart Status: Active

## 2017-03-27 NOTE — ASSESSMENT & PLAN NOTE
Chronic condition managed with current medical regimen  Stable per review   Continue with surveillance  Followed by JANNY Pedraza.

## 2017-03-27 NOTE — ASSESSMENT & PLAN NOTE
Chronic condition managed with current medical regimen  Stable per review   Continue with current OTC meds  Followed by JANNY Pedraza.

## 2017-03-27 NOTE — ASSESSMENT & PLAN NOTE
/78  Chronic condition managed with current medical regimen  Stable per review   Continue with current meds  Followed by JANNY Pedraza.

## 2017-03-27 NOTE — ASSESSMENT & PLAN NOTE
Chronic condition managed with current medical regimen  Stable per review   Continue with current meds  Followed by JANNY Pedraza.

## 2017-03-27 NOTE — ASSESSMENT & PLAN NOTE
Lost son, suicide, cont to deal with his loss   Continue with current meds  Followed by JANNY Pedraza.

## 2017-03-27 NOTE — ASSESSMENT & PLAN NOTE
2/2016   Pth, Intact 64.3 14.0 - 72.0 pg/mL Final       States had surg in past, unsure if all 4 was removed  Followed by JANNY Pedraza.

## 2017-03-27 NOTE — ASSESSMENT & PLAN NOTE
Chronic condition managed with current medical regimen  Stable per review   Continue with surviellance  Followed by JANNY Pedraza.

## 2017-03-27 NOTE — ASSESSMENT & PLAN NOTE
Chronic condition managed with current medical regimen  Stable per review   Continue with OTC prn meds  Followed by JANNY Pedraza.

## 2017-03-31 ENCOUNTER — TELEPHONE (OUTPATIENT)
Dept: MEDICAL GROUP | Facility: PHYSICIAN GROUP | Age: 72
End: 2017-03-31

## 2017-03-31 NOTE — TELEPHONE ENCOUNTER
----- Message from Your Healthcare Team sent at 3/31/2017  5:00 AM PDT -----  Regarding: Lab results  Aime,  I have reviewed your labs, they all pretty good. Your calcium is very mildly elevated at 10.6, the high end of normal is 10.5. We will recheck this before your follow up appointment with me in May. Do you take a calcium supplement?  SUNNY Krueger

## 2017-03-31 NOTE — TELEPHONE ENCOUNTER
I sent the below information to patient and a mitral message a week ago--he still has not read the message. Please call patient with this information. Thank you.

## 2017-03-31 NOTE — Clinical Note
April 4, 2017        Celio Abreu  [address]  [place] [address]        Dear Celio:    I have reviewed your labs, they all pretty good. Your calcium is very mildly elevated at 10.6, the high end of normal is 10.5. We will recheck this before your follow up appointment with me in May. Do you take a calcium supplement?      If you have any questions or concerns, please don't hesitate to call.        Sincerely,        AMARIS Pedraza.    Electronically Signed

## 2017-05-09 ENCOUNTER — HOSPITAL ENCOUNTER (OUTPATIENT)
Dept: LAB | Facility: MEDICAL CENTER | Age: 72
End: 2017-05-09
Attending: UROLOGY
Payer: MEDICARE

## 2017-05-09 LAB — PSA SERPL-MCNC: 0.67 NG/ML (ref 0–4)

## 2017-05-09 PROCEDURE — 36415 COLL VENOUS BLD VENIPUNCTURE: CPT

## 2017-05-09 PROCEDURE — 84153 ASSAY OF PSA TOTAL: CPT

## 2017-05-09 RX ORDER — SERTRALINE HYDROCHLORIDE 100 MG/1
TABLET, FILM COATED ORAL
Qty: 180 TAB | Refills: 1 | Status: SHIPPED | OUTPATIENT
Start: 2017-05-09 | End: 2017-11-12 | Stop reason: SDUPTHER

## 2017-06-12 ENCOUNTER — TELEPHONE (OUTPATIENT)
Dept: MEDICAL GROUP | Facility: PHYSICIAN GROUP | Age: 72
End: 2017-06-12

## 2017-06-12 ENCOUNTER — OFFICE VISIT (OUTPATIENT)
Dept: MEDICAL GROUP | Facility: PHYSICIAN GROUP | Age: 72
End: 2017-06-12
Payer: MEDICARE

## 2017-06-12 ENCOUNTER — HOSPITAL ENCOUNTER (OUTPATIENT)
Dept: LAB | Facility: MEDICAL CENTER | Age: 72
End: 2017-06-12
Attending: NURSE PRACTITIONER
Payer: MEDICARE

## 2017-06-12 VITALS
HEART RATE: 92 BPM | OXYGEN SATURATION: 92 % | TEMPERATURE: 97.9 F | SYSTOLIC BLOOD PRESSURE: 102 MMHG | WEIGHT: 187 LBS | HEIGHT: 70 IN | DIASTOLIC BLOOD PRESSURE: 66 MMHG | BODY MASS INDEX: 26.77 KG/M2 | RESPIRATION RATE: 16 BRPM

## 2017-06-12 DIAGNOSIS — E83.52 HYPERCALCEMIA: ICD-10-CM

## 2017-06-12 DIAGNOSIS — K21.00 GASTROESOPHAGEAL REFLUX DISEASE WITH ESOPHAGITIS: ICD-10-CM

## 2017-06-12 DIAGNOSIS — Z23 NEED FOR TDAP VACCINATION: ICD-10-CM

## 2017-06-12 DIAGNOSIS — I10 ESSENTIAL HYPERTENSION: ICD-10-CM

## 2017-06-12 DIAGNOSIS — Z00.00 HEALTH CARE MAINTENANCE: ICD-10-CM

## 2017-06-12 LAB — CALCIUM SERPL-MCNC: 10.7 MG/DL (ref 8.5–10.5)

## 2017-06-12 PROCEDURE — 90715 TDAP VACCINE 7 YRS/> IM: CPT | Performed by: NURSE PRACTITIONER

## 2017-06-12 PROCEDURE — 36415 COLL VENOUS BLD VENIPUNCTURE: CPT

## 2017-06-12 PROCEDURE — 82310 ASSAY OF CALCIUM: CPT

## 2017-06-12 PROCEDURE — 99214 OFFICE O/P EST MOD 30 MIN: CPT | Mod: 25 | Performed by: NURSE PRACTITIONER

## 2017-06-12 PROCEDURE — 90471 IMMUNIZATION ADMIN: CPT | Performed by: NURSE PRACTITIONER

## 2017-06-12 NOTE — PROGRESS NOTES
Chief Complaint   Patient presents with   • Abdominal Pain     fv-did not start omeprazole         This is a 72 y.o.male patient that presents today with the following: Follow-up visit, discuss acute and chronic conditions    Gastroesophageal reflux disease with esophagitis  Pt continues to have upper abdominal and epigastric pain off and on. He cannot think of anything that makes it worse or makes it better. He was prescribed omeprazole at his last visit but admits that he never started the medications. He was encouraged to start the medication, take on empty stomach first thing in the morning.     He was encouraged to avoid aggravating food and activities. He was given printed educational material as well. He will follow-up with me in 6 months.    Hypercalcemia  Patient does have history of hypercalcemia and history of hyperparathyroidism related to parathyroid adenoma that has since been removed. His calcium level in March was 10.6. Was due for follow up lab to check calcium level before his office visit today. He did not have this done, he can have done today. I will call her with results and any further actions if needed.    HTN (hypertension)  This is a chronic condition, stable and well-controlled on current regimen. His blood pressure today is 102/66, he denies symptoms of hypertension. He does not need refills on his medication, he can call when needed. He tolerates medication well with no significant bothersome side effects. He will be due for labs in March 2018. I would like him to follow-up with me in 6 months. He is continue with healthy eating, regular physical activity and continued efforts towards weight loss.    Health care maintenance  Patient is up-to-date with labs he will be due again in March 2018. He is up-to-date with colon cancer screening exams and immunizations, as he will be getting his TDaP today. He believes that he may have had pneumonia immunizations, we will check [employer].       No  "visits with results within 1 Month(s) from this visit.  Latest known visit with results is:    Hospital Outpatient Visit on 05/09/2017   Component Date Value   • Prostatic Specific Antig* 05/09/2017 0.67          clinical course has been stable    Past Medical History   Diagnosis Date   • Essential hypertension, malignant    • Hypertrophy of prostate with urinary obstruction and other lower urinary tract symptoms (LUTS)    • Insomnia, unspecified    • Right bundle branch block and left anterior fascicular block    • Ingrowing nail      RT GREAT TOE   • Degeneration of lumbar or lumbosacral intervertebral disc    • Allergic rhinitis, cause unspecified    • Unspecified deficiency anemia    • Depressive disorder, not elsewhere classified    • Anxiety state, unspecified    • Esophageal reflux    • Generalized anxiety disorder 11/10/2009   • Unspecified hemorrhagic conditions      nose bleeds   • History of kidney stones    • SOB (shortness of breath) 10/24/2014       Past Surgical History   Procedure Laterality Date   • Lumbar laminectomy diskectomy  2009   • Cervical disk and fusion anterior  2009   • Lithotripsy       \"several years ago\"   • Inguinal hernia repair       left   • Parathyroid exploration  7/25/2012     Performed by GURVINDER KENDALL at SURGERY SAME DAY [facility] ORS       Family History   Problem Relation Age of Onset   • Cancer Mother      LIVER   • No Known Problems Father        Xanax    Current Outpatient Prescriptions Ordered in [facility]   Medication Sig Dispense Refill   • sertraline (ZOLOFT) 100 MG Tab TAKE 2 TABLETS BY MOUTH DAILY.  180 Tab 1   • Multiple Vitamins-Minerals (CENTRUM SILVER ADULT 50+ PO) Take  by mouth.     • terazosin (HYTRIN) 5 MG Cap Take 1 Cap by mouth every day. 90 Cap 3   • lisinopril (PRINIVIL, ZESTRIL) 40 MG tablet TAKE 1 TABLET BY MOUTH DAILY. 90 Tab 3   • omeprazole (PRILOSEC) 20 MG delayed-release capsule Take 1 Cap by mouth every day. 90 Cap 1     No current Epic-ordered " "facility-administered medications on file.       Constitutional ROS: No unexpected change in weight, No weakness, No unexplained fevers, sweats, or chills  Pulmonary ROS: No chronic cough, sputum, or hemoptysis, No shortness of breath, No recent change in breathing  Cardiovascular ROS: No chest pain, No edema, No palpitations, Positive for hypertension, controlled  Gastrointestinal ROS: Positive per history of present illness  Musculoskeletal/Extremities ROS: No clubbing, No peripheral edema, No pain, redness or swelling on the joints  Neurologic ROS: Normal development, No seizures, No weakness    Physical exam:  /66 mmHg  Pulse 92  Temp(Src) 36.6 °C (97.9 °F)  Resp 16  Ht 1.765 m (5' 9.5\")  Wt 84.823 kg (187 lb)  BMI 27.23 kg/m2  SpO2 92%  General Appearance: Elderly male, alert, no distress, moderately overweight, well-groomed  Skin: Skin color, texture, turgor normal. No rashes or lesions.  Lungs: negative findings: normal respiratory rate and rhythm, lungs clear to auscultation  Heart: negative. RRR without murmur, gallop, or rubs.  No ectopy.  Abdomen: Abdomen soft, non-tender. BS normal. No masses,  No organomegaly  Musculoskeletal: negative findings: ROM of all joints is normal, no evidence of joint instability, strength normal, no deformities present  Neurologic: intact, oriented, mood appropriate, judgment intact. Cranial nerves II-12 grossly intact    Medical decision making/discussion: Patient here for follow-up visit, discuss acute and chronic conditions. He will get labs done today to check calcium level. I will call him with results and further actions if needed. He will get TDaP today. He is to follow-up with me in 6 months. He is to have follow-up labs done in March 2018. He is to start taking his omeprazole as previously prescribed, on empty stomach first thing in the morning. He was given printed educational material on foods to avoid. He is to continue with healthy eating, regular " physical activity and continued efforts towards weight loss.     Celio was seen today for abdominal pain.    Diagnoses and all orders for this visit:    Essential hypertension    Gastroesophageal reflux disease with esophagitis    Hypercalcemia  -     CALCIUM (CA); Future    Health care maintenance    Need for Tdap vaccination  -     TDAP VACCINE =>8YO IM          Please note that this dictation was created using voice recognition software. I have made every reasonable attempt to correct obvious errors, but I expect that there are errors of grammar and possibly content that I did not discover before finalizing the note.

## 2017-06-12 NOTE — MR AVS SNAPSHOT
"        Celio Elamcarline   2017 1:00 PM   Office Visit   MRN: [id number]    Department:  [facility]   Dept Phone:  [phone]    Description:  Male : 1945   Provider:  JANNY Pedraza           Reason for Visit     Abdominal Pain fv-did not start omeprazole      Allergies as of 2017     Allergen Noted Reactions    Xanax [Alprazolam] 2012       confusion      You were diagnosed with     Gastroesophageal reflux disease with esophagitis   [1599752]       Hypercalcemia   [275.42.ICD-9-CM]       Need for Tdap vaccination   [134502]         Vital Signs     Blood Pressure Pulse Temperature Respirations Height Weight    102/66 mmHg 92 36.6 °C (97.9 °F) 16 1.765 m (5' 9.5\") 84.823 kg (187 lb)    Body Mass Index Oxygen Saturation Smoking Status             27.23 kg/m2 92% Former Smoker         Basic Information     Date Of Birth Sex Race Ethnicity Preferred Language    1945 Male White Non- English      Problem List              ICD-10-CM Priority Class Noted - Resolved    Generalized anxiety disorder (Chronic) F41.1   11/10/2009 - Present    HTN (hypertension) I10   2010 - Present    Depression F32.9   2010 - Present    Chronic back pain M54.9, G89.29   2010 - Present    Murmur, cardiac R01.1   2010 - Present    Osteopenia M85.80   3/28/2011 - Present    Seasonal allergies J30.2   2011 - Present    Kidney stones N20.0   3/26/2012 - Present    RBBB I45.10   2012 - Present    Hyperparathyroidism ([facility]) E21.3   2012 - Present    SOB (shortness of breath) R06.02   10/24/2014 - Present    Anemia D64.9   2015 - Present    Gastroesophageal reflux disease with esophagitis K21.0   2016 - Present    Colon polyps K63.5   2016 - Present    Lumbago of lumbar region with sciatica M54.40   3/16/2016 - Present    Grief at loss of child F43.21, Z63.4   3/16/2016 - Present    Hypercalcemia E83.52   3/16/2016 - Present    Weight loss " R63.4   4/8/2016 - Present    Generalized abdominal pain R10.84   3/24/2017 - Present      Health Maintenance        Date Due Completion Dates    IMM DTaP/Tdap/Td Vaccine (1 - Tdap) 4/10/1963 ---    IMM PNEUMOCOCCAL 65+ (ADULT) LOW/MEDIUM RISK SERIES (2 of 2 - PCV13) 10/24/2015 10/24/2014    COLONOSCOPY 6/8/2026 6/8/2016            Current Immunizations     Influenza Vaccine Quad Inj (Pf) 10/24/2014 12:34 PM    Pneumococcal polysaccharide vaccine (PPSV-23) 10/24/2014 12:37 PM    SHINGLES VACCINE 5/4/2016    Tdap Vaccine 6/12/2017      Below and/or attached are the medications your provider expects you to take. Review all of your home medications and newly ordered medications with your provider and/or pharmacist. Follow medication instructions as directed by your provider and/or pharmacist. Please keep your medication list with you and share with your provider. Update the information when medications are discontinued, doses are changed, or new medications (including over-the-counter products) are added; and carry medication information at all times in the event of emergency situations     Allergies:  XANAX - (reactions not documented)               Medications  Valid as of: June 12, 2017 -  1:51 PM    Generic Name Brand Name Tablet Size Instructions for use    Lisinopril (Tab) PRINIVIL, ZESTRIL 40 MG TAKE 1 TABLET BY MOUTH DAILY.        Multiple Vitamins-Minerals   Take  by mouth.        Omeprazole (CAPSULE DELAYED RELEASE) PRILOSEC 20 MG Take 1 Cap by mouth every day.        Sertraline HCl (Tab) ZOLOFT 100 MG TAKE 2 TABLETS BY MOUTH DAILY.         Terazosin HCl (Cap) HYTRIN 5 MG Take 1 Cap by mouth every day.        .                 Medicines prescribed today were sent to:     [id number] - [place], [place] - [address]    [address] [place] [address]    Phone: [phone] Fax: [phone]    Open 24 Hours?: No      Medication refill instructions:       If your prescription bottle  indicates you have medication refills left, it is not necessary to call your provider’s office. Please contact your pharmacy and they will refill your medication.    If your prescription bottle indicates you do not have any refills left, you may request refills at any time through one of the following ways: The online [employer] system (except Urgent Care), by calling your provider’s office, or by asking your pharmacy to contact your provider’s office with a refill request. Medication refills are processed only during regular business hours and may not be available until the next business day. Your provider may request additional information or to have a follow-up visit with you prior to refilling your medication.   *Please Note: Medication refills are assigned a new Rx number when refilled electronically. Your pharmacy may indicate that no refills were authorized even though a new prescription for the same medication is available at the pharmacy. Please request the medicine by name with the pharmacy before contacting your provider for a refill.        Your To Do List     Future Labs/Procedures Complete By Expires    CALCIUM (CA)  As directed 6/12/2018      Instructions    Start the omeprazole    Have lab done today    Follow up with me in 6 months     Food Choices for Gastroesophageal Reflux Disease, Adult  When you have gastroesophageal reflux disease (GERD), the foods you eat and your eating habits are very important. Choosing the right foods can help ease the discomfort of GERD.  WHAT GENERAL GUIDELINES DO I NEED TO FOLLOW?  · Choose fruits, vegetables, whole grains, low-fat dairy products, and low-fat meat, fish, and poultry.  · Limit fats such as oils, salad dressings, butter, nuts, and avocado.  · Keep a food diary to identify foods that cause symptoms.  · Avoid foods that cause reflux. These may be different for different people.  · Eat frequent small meals instead of three large meals each day.  · Eat your  meals slowly, in a relaxed setting.  · Limit fried foods.  · Cook foods using methods other than frying.  · Avoid drinking alcohol.  · Avoid drinking large amounts of liquids with your meals.  · Avoid bending over or lying down until 2-3 hours after eating.  WHAT FOODS ARE NOT RECOMMENDED?  The following are some foods and drinks that may worsen your symptoms:  Vegetables  Tomatoes. Tomato juice. Tomato and spaghetti sauce. Chili peppers. Onion and garlic. Horseradish.  Fruits  Oranges, grapefruit, and lemon (fruit and juice).  Meats  High-fat meats, fish, and poultry. This includes hot dogs, ribs, ham, sausage, salami, and lopez.  Dairy  Whole milk and chocolate milk. Sour cream. Cream. Butter. Ice cream. Cream cheese.   Beverages  Coffee and tea, with or without caffeine. Carbonated beverages or energy drinks.  Condiments  Hot sauce. Barbecue sauce.   Sweets/Desserts  Chocolate and cocoa. Donuts. Peppermint and spearmint.  Fats and Oils  High-fat foods, including French fries and potato chips.  Other  Vinegar. Strong spices, such as black pepper, white pepper, red pepper, cayenne, veronica powder, cloves, ginger, and chili powder.  The items listed above may not be a complete list of foods and beverages to avoid. Contact your dietitian for more information.     This information is not intended to replace advice given to you by your health care provider. Make sure you discuss any questions you have with your health care provider.     Document Released: 12/18/2006 Document Revised: 01/08/2016 Document Reviewed: 10/22/2014  [employer] Interactive Patient Education ©2016 [employer]vier Inc[employer]         Other Notes About Your Plan     Dr. Cruz-Urology  Dr. Vásquez-Neuro           MyChart Access Code: Activation code not generated  Current MyChart Status: Active

## 2017-06-12 NOTE — ASSESSMENT & PLAN NOTE
Pt continues to have upper abdominal and epigastric pain off and on. He cannot think of anything that makes it worse or makes it better. He was prescribed omeprazole at his last visit but admits that he never started the medications. He was encouraged to start the medication, take on empty stomach first thing in the morning.     He was encouraged to avoid aggravating food and activities. He was given printed educational material as well. He will follow-up with me in 6 months.

## 2017-06-12 NOTE — ASSESSMENT & PLAN NOTE
Patient is up-to-date with labs he will be due again in March 2018. He is up-to-date with colon cancer screening exams and immunizations, as he will be getting his TDaP today. He believes that he may have had pneumonia immunizations, we will check [employer].

## 2017-06-12 NOTE — ASSESSMENT & PLAN NOTE
Patient does have history of hypercalcemia when he has his history of hyperparathyroidism related to parathyroid adenoma that has since been removed. His calcium level in March was 10.6. Was due for follow up lab to check calcium level before his office visit today. He did not have this done, he can have done today. I will call her with results and any further actions if needed.

## 2017-06-12 NOTE — PATIENT INSTRUCTIONS
Start the omeprazole    Have lab done today    Follow up with me in 6 months     Food Choices for Gastroesophageal Reflux Disease, Adult  When you have gastroesophageal reflux disease (GERD), the foods you eat and your eating habits are very important. Choosing the right foods can help ease the discomfort of GERD.  WHAT GENERAL GUIDELINES DO I NEED TO FOLLOW?  · Choose fruits, vegetables, whole grains, low-fat dairy products, and low-fat meat, fish, and poultry.  · Limit fats such as oils, salad dressings, butter, nuts, and avocado.  · Keep a food diary to identify foods that cause symptoms.  · Avoid foods that cause reflux. These may be different for different people.  · Eat frequent small meals instead of three large meals each day.  · Eat your meals slowly, in a relaxed setting.  · Limit fried foods.  · Cook foods using methods other than frying.  · Avoid drinking alcohol.  · Avoid drinking large amounts of liquids with your meals.  · Avoid bending over or lying down until 2-3 hours after eating.  WHAT FOODS ARE NOT RECOMMENDED?  The following are some foods and drinks that may worsen your symptoms:  Vegetables  Tomatoes. Tomato juice. Tomato and spaghetti sauce. Chili peppers. Onion and garlic. Horseradish.  Fruits  Oranges, grapefruit, and lemon (fruit and juice).  Meats  High-fat meats, fish, and poultry. This includes hot dogs, ribs, ham, sausage, salami, and lopez.  Dairy  Whole milk and chocolate milk. Sour cream. Cream. Butter. Ice cream. Cream cheese.   Beverages  Coffee and tea, with or without caffeine. Carbonated beverages or energy drinks.  Condiments  Hot sauce. Barbecue sauce.   Sweets/Desserts  Chocolate and cocoa. Donuts. Peppermint and spearmint.  Fats and Oils  High-fat foods, including French fries and potato chips.  Other  Vinegar. Strong spices, such as black pepper, white pepper, red pepper, cayenne, veronica powder, cloves, ginger, and chili powder.  The items listed above may not be a  complete list of foods and beverages to avoid. Contact your dietitian for more information.     This information is not intended to replace advice given to you by your health care provider. Make sure you discuss any questions you have with your health care provider.     Document Released: 12/18/2006 Document Revised: 01/08/2016 Document Reviewed: 10/22/2014  [employer] Interactive Patient Education ©2016 Elsevier Inc.

## 2017-06-12 NOTE — ASSESSMENT & PLAN NOTE
This is a chronic condition, stable and well-controlled on current regimen. His blood pressure today is 102/66, he denies symptoms of hypertension. He does not need refills on his medication, he can call when needed. He tolerates medication well with no significant bothersome side effects. He will be due for labs in March 2018. I would like him to follow-up with me in 6 months. He is continue with healthy eating, regular physical activity and continued efforts towards weight loss.

## 2018-02-08 NOTE — LETTER
February 16, 2018        Celio Abreu  [address]  [place] [address]        Dear Celio:    We refilled your medication for 3 months. Please contact us to schedule a follow-up appointment for future fills.    If you have any questions or concerns, please don't hesitate to call.        Sincerely,        AMARIS Pedraza.    Electronically Signed

## 2018-02-09 RX ORDER — SERTRALINE HYDROCHLORIDE 100 MG/1
TABLET, FILM COATED ORAL
Qty: 180 TAB | Refills: 0 | Status: SHIPPED | OUTPATIENT
Start: 2018-02-09 | End: 2018-05-09 | Stop reason: SDUPTHER

## 2018-04-03 DIAGNOSIS — N20.0 KIDNEY STONES: ICD-10-CM

## 2018-04-03 NOTE — LETTER
May 2, 2018        Celio Abreu  [address]  [place] [address]        Dear Celio:      Your prescription has been sent to the pharmacy for a 3 month supply. You are due for an appointment. Please call [phone] to schedule prior to your next refill.       If you have any questions or concerns, please don't hesitate to call.        Sincerely,        AMARIS Pedraza.    Electronically Signed

## 2018-04-03 NOTE — TELEPHONE ENCOUNTER
Was the patient seen in the last year in this department? Yes     Does patient have an active prescription for medications requested? No     Received Request Via: Pharmacy    Pt met protocol?: Yes     Last OV 06/2017

## 2018-04-04 RX ORDER — TERAZOSIN 5 MG/1
5 CAPSULE ORAL DAILY
Qty: 90 CAP | Refills: 0 | Status: SHIPPED | OUTPATIENT
Start: 2018-04-04 | End: 2018-07-09 | Stop reason: SDUPTHER

## 2018-04-25 ENCOUNTER — TELEPHONE (OUTPATIENT)
Dept: MEDICAL GROUP | Facility: PHYSICIAN GROUP | Age: 73
End: 2018-04-25

## 2018-04-25 DIAGNOSIS — I10 ESSENTIAL HYPERTENSION: ICD-10-CM

## 2018-04-25 DIAGNOSIS — M85.80 OSTEOPENIA, UNSPECIFIED LOCATION: ICD-10-CM

## 2018-04-26 RX ORDER — LISINOPRIL 40 MG/1
TABLET ORAL
Qty: 90 TAB | Refills: 0 | Status: SHIPPED | OUTPATIENT
Start: 2018-04-26 | End: 2018-06-27 | Stop reason: SDUPTHER

## 2018-05-02 ENCOUNTER — HOSPITAL ENCOUNTER (OUTPATIENT)
Dept: LAB | Facility: MEDICAL CENTER | Age: 73
End: 2018-05-02
Attending: NURSE PRACTITIONER
Payer: MEDICARE

## 2018-05-02 DIAGNOSIS — I10 ESSENTIAL HYPERTENSION: ICD-10-CM

## 2018-05-02 DIAGNOSIS — M85.80 OSTEOPENIA, UNSPECIFIED LOCATION: ICD-10-CM

## 2018-05-03 ENCOUNTER — HOSPITAL ENCOUNTER (OUTPATIENT)
Dept: LAB | Facility: MEDICAL CENTER | Age: 73
End: 2018-05-03
Attending: NURSE PRACTITIONER
Payer: MEDICARE

## 2018-05-03 LAB
25(OH)D3 SERPL-MCNC: 27 NG/ML (ref 30–100)
ALBUMIN SERPL BCP-MCNC: 4 G/DL (ref 3.2–4.9)
ALBUMIN/GLOB SERPL: 1.6 G/DL
ALP SERPL-CCNC: 76 U/L (ref 30–99)
ALT SERPL-CCNC: 15 U/L (ref 2–50)
ANION GAP SERPL CALC-SCNC: 8 MMOL/L (ref 0–11.9)
AST SERPL-CCNC: 17 U/L (ref 12–45)
BILIRUB SERPL-MCNC: 0.5 MG/DL (ref 0.1–1.5)
BUN SERPL-MCNC: 15 MG/DL (ref 8–22)
CALCIUM SERPL-MCNC: 9.8 MG/DL (ref 8.5–10.5)
CHLORIDE SERPL-SCNC: 107 MMOL/L (ref 96–112)
CHOLEST SERPL-MCNC: 109 MG/DL (ref 100–199)
CO2 SERPL-SCNC: 24 MMOL/L (ref 20–33)
CREAT SERPL-MCNC: 0.87 MG/DL (ref 0.5–1.4)
ERYTHROCYTE [DISTWIDTH] IN BLOOD BY AUTOMATED COUNT: 46.4 FL (ref 35.9–50)
GLOBULIN SER CALC-MCNC: 2.5 G/DL (ref 1.9–3.5)
GLUCOSE SERPL-MCNC: 95 MG/DL (ref 65–99)
HCT VFR BLD AUTO: 40.3 % (ref 42–52)
HDLC SERPL-MCNC: 34 MG/DL
HGB BLD-MCNC: 13.3 G/DL (ref 14–18)
LDLC SERPL CALC-MCNC: 59 MG/DL
MCH RBC QN AUTO: 30.7 PG (ref 27–33)
MCHC RBC AUTO-ENTMCNC: 33 G/DL (ref 33.7–35.3)
MCV RBC AUTO: 93.1 FL (ref 81.4–97.8)
PLATELET # BLD AUTO: 119 K/UL (ref 164–446)
PMV BLD AUTO: 10.4 FL (ref 9–12.9)
POTASSIUM SERPL-SCNC: 4.2 MMOL/L (ref 3.6–5.5)
PROT SERPL-MCNC: 6.5 G/DL (ref 6–8.2)
RBC # BLD AUTO: 4.33 M/UL (ref 4.7–6.1)
SODIUM SERPL-SCNC: 139 MMOL/L (ref 135–145)
TRIGL SERPL-MCNC: 78 MG/DL (ref 0–149)
WBC # BLD AUTO: 5 K/UL (ref 4.8–10.8)

## 2018-05-03 PROCEDURE — 80053 COMPREHEN METABOLIC PANEL: CPT

## 2018-05-03 PROCEDURE — 80061 LIPID PANEL: CPT

## 2018-05-03 PROCEDURE — 82306 VITAMIN D 25 HYDROXY: CPT

## 2018-05-03 PROCEDURE — 85027 COMPLETE CBC AUTOMATED: CPT

## 2018-05-03 PROCEDURE — 36415 COLL VENOUS BLD VENIPUNCTURE: CPT

## 2018-05-09 ENCOUNTER — PATIENT OUTREACH (OUTPATIENT)
Dept: HEALTH INFORMATION MANAGEMENT | Facility: OTHER | Age: 73
End: 2018-05-09

## 2018-05-09 ENCOUNTER — TELEPHONE (OUTPATIENT)
Dept: MEDICAL GROUP | Facility: PHYSICIAN GROUP | Age: 73
End: 2018-05-09

## 2018-05-09 RX ORDER — SERTRALINE HYDROCHLORIDE 100 MG/1
TABLET, FILM COATED ORAL
Qty: 60 TAB | Refills: 0 | Status: SHIPPED | OUTPATIENT
Start: 2018-05-09 | End: 2018-06-26 | Stop reason: SDUPTHER

## 2018-05-09 NOTE — TELEPHONE ENCOUNTER
Called [phone] (home) spoke with female-states patient is at his eye exam but she will have him call in to schedule an appointment with SK to go over lab results.

## 2018-05-09 NOTE — TELEPHONE ENCOUNTER
----- Message from JANNY Pedraza sent at 5/4/2018 11:23 AM PDT -----  Please let pt know that I would like him to make appt to go over labs, I have not seen him since June 2017

## 2018-05-09 NOTE — PROGRESS NOTES
1. Attempt #: FINAL     2. [facility] Verified: no    3. Verify PCP: yes    4. Care Team Updated:       •   DME Company (gait device, O2, CPAP, etc.): NO       •   Other Specialists (eye doctor, derm, GYN, cardiology, endo, etc): YES    5.  Reviewed/Updated the following with patient:       •   Communication Preference Obtained? YES       •   Preferred Pharmacy? YES       •   Preferred Lab? YES       •   Family History (document living status of immediate family members and if + hx of cancer, diabetes, hypertension, hyperlipidemia, heart attack, stroke) YES. Was Abstract Encounter opened and chart updated? YES    6. [employer] Activation: already active    7. [facility] Lida: yes    8. Annual Wellness Visit Scheduling  Scheduling Status:Scheduled      9. Care Gap Scheduling (Attempt to Schedule EACH Overdue Care Gap!)     Health Maintenance Due   Topic Date Due   • IMM PNEUMOCOCCAL 65+ (ADULT) LOW/MEDIUM RISK SERIES (2 of 2 - PCV13) 10/24/2015   • Annual Wellness Visit  03/28/2018        Scheduled patient for Annual Wellness Visit    10. Patient was advised: “This is a free wellness visit. The provider will screen for medical conditions to help you stay healthy. If you have other concerns to address you may be asked to discuss these at a separate visit or there may be an additional fee.”     11. Patient was informed to arrive 15 min prior to their scheduled appointment and bring in their medication bottles.

## 2018-05-10 RX ORDER — SERTRALINE HYDROCHLORIDE 100 MG/1
TABLET, FILM COATED ORAL
Refills: 0 | OUTPATIENT
Start: 2018-05-10

## 2018-06-07 ENCOUNTER — OFFICE VISIT (OUTPATIENT)
Dept: MEDICAL GROUP | Facility: PHYSICIAN GROUP | Age: 73
End: 2018-06-07
Payer: MEDICARE

## 2018-06-07 VITALS
TEMPERATURE: 98 F | HEIGHT: 70 IN | BODY MASS INDEX: 26.2 KG/M2 | WEIGHT: 183 LBS | SYSTOLIC BLOOD PRESSURE: 118 MMHG | DIASTOLIC BLOOD PRESSURE: 60 MMHG | RESPIRATION RATE: 16 BRPM | HEART RATE: 82 BPM | OXYGEN SATURATION: 98 %

## 2018-06-07 DIAGNOSIS — E21.3 HYPERPARATHYROIDISM (HCC): ICD-10-CM

## 2018-06-07 DIAGNOSIS — J30.9 ALLERGIC RHINITIS, UNSPECIFIED SEASONALITY, UNSPECIFIED TRIGGER: ICD-10-CM

## 2018-06-07 DIAGNOSIS — R05.9 COUGH: ICD-10-CM

## 2018-06-07 DIAGNOSIS — K21.00 GASTROESOPHAGEAL REFLUX DISEASE WITH ESOPHAGITIS: ICD-10-CM

## 2018-06-07 DIAGNOSIS — E55.9 VITAMIN D INSUFFICIENCY: ICD-10-CM

## 2018-06-07 DIAGNOSIS — D64.9 ANEMIA, UNSPECIFIED TYPE: ICD-10-CM

## 2018-06-07 PROCEDURE — 99214 OFFICE O/P EST MOD 30 MIN: CPT | Performed by: NURSE PRACTITIONER

## 2018-06-07 RX ORDER — FLUTICASONE PROPIONATE 50 MCG
1 SPRAY, SUSPENSION (ML) NASAL 2 TIMES DAILY
Qty: 2 BOTTLE | Refills: 6 | Status: SHIPPED | OUTPATIENT
Start: 2018-06-07 | End: 2018-06-07 | Stop reason: SDUPTHER

## 2018-06-07 RX ORDER — LEVOCETIRIZINE DIHYDROCHLORIDE 5 MG/1
5 TABLET, FILM COATED ORAL DAILY
Qty: 90 TAB | Refills: 3 | Status: SHIPPED | OUTPATIENT
Start: 2018-06-07 | End: 2018-06-07 | Stop reason: SDUPTHER

## 2018-06-07 RX ORDER — FLUTICASONE PROPIONATE 50 MCG
1 SPRAY, SUSPENSION (ML) NASAL 2 TIMES DAILY
Qty: 2 BOTTLE | Refills: 6 | Status: SHIPPED | OUTPATIENT
Start: 2018-06-07 | End: 2019-06-06 | Stop reason: CLARIF

## 2018-06-07 RX ORDER — LEVOCETIRIZINE DIHYDROCHLORIDE 5 MG/1
5 TABLET, FILM COATED ORAL DAILY
Qty: 90 TAB | Refills: 3 | Status: SHIPPED | OUTPATIENT
Start: 2018-06-07 | End: 2019-06-06 | Stop reason: CLARIF

## 2018-06-07 ASSESSMENT — PATIENT HEALTH QUESTIONNAIRE - PHQ9: CLINICAL INTERPRETATION OF PHQ2 SCORE: 0

## 2018-06-07 NOTE — ASSESSMENT & PLAN NOTE
This is new, been going on for the past couple of months. Feels a constant tickle in the back of his throat. He brings up yellow/green mucus. Has a constant runny nose, mucus is clear. He had sinus pressure and nasal congestion. It is worse in the spring.

## 2018-06-07 NOTE — PROGRESS NOTES
Chief Complaint   Patient presents with   • Cough     throat irritation x 2 months   • Hypertension     fv labs         This is a 73 y.o.male patient that presents today with the following: Cough, review labs    Cough  This is new, been going on for the past couple of months. Feels a constant tickle in the back of his throat. He brings up yellow/green mucus. Has a constant runny nose, mucus is clear. He had sinus pressure and nasal congestion. It is worse in the spring.     Hyperparathyroidism  Pt has history of hyperparathyroidism, states he had one of glands removed.    Anemia  Pt has history of anemia, he recently had CBC which showed following:  Component      Latest Ref Rng & Units 5/3/2018           7:29 AM   WBC      4.8 - 10.8 K/uL 5.0   RBC      4.70 - 6.10 M/uL 4.33 (L)   Hemoglobin      14.0 - 18.0 g/dL 13.3 (L)   Hematocrit      42.0 - 52.0 % 40.3 (L)   MCV      81.4 - 97.8 fL 93.1   MCH      27.0 - 33.0 pg 30.7   MCHC      33.7 - 35.3 g/dL 33.0 (L)   RDW      35.9 - 50.0 fL 46.4   Platelet Count      164 - 446 K/uL 119 (L)   MPV      9.0 - 12.9 fL 10.4       He states that he has been treated for iron deficiency in the past, new labs have been ordered for him.  He does deny symptoms.    Gastroesophageal reflux disease with esophagitis  This is a chronic condition, stable and fairly well controlled on omeprazole 20 mg daily.  He tolerates this well with no significant or bothersome side effects.  He is good about avoiding aggravating foods and activities as much as possible.    Vitamin D insufficiency  This is a chronic, condition stable.  His last vitamin D level was 27 encouraged him to take over-the-counter supplement about 1000 2000 units daily.      No visits with results within 1 Month(s) from this visit.   Latest known visit with results is:   Hospital Outpatient Visit on 05/03/2018   Component Date Value   • 25-Hydroxy   Vitamin D 25 05/03/2018 27*   • WBC 05/03/2018 5.0    • RBC 05/03/2018 4.33*    • Hemoglobin 05/03/2018 13.3*   • Hematocrit 05/03/2018 40.3*   • MCV 05/03/2018 93.1    • MCH 05/03/2018 30.7    • MCHC 05/03/2018 33.0*   • RDW 05/03/2018 46.4    • Platelet Count 05/03/2018 119*   • MPV 05/03/2018 10.4    • Sodium 05/03/2018 139    • Potassium 05/03/2018 4.2    • Chloride 05/03/2018 107    • Co2 05/03/2018 24    • Anion Gap 05/03/2018 8.0    • Glucose 05/03/2018 95    • Bun 05/03/2018 15    • Creatinine 05/03/2018 0.87    • Calcium 05/03/2018 9.8    • AST(SGOT) 05/03/2018 17    • ALT(SGPT) 05/03/2018 15    • Alkaline Phosphatase 05/03/2018 76    • Total Bilirubin 05/03/2018 0.5    • Albumin 05/03/2018 4.0    • Total Protein 05/03/2018 6.5    • Globulin 05/03/2018 2.5    • A-G Ratio 05/03/2018 1.6    • Cholesterol,Tot 05/03/2018 109    • Triglycerides 05/03/2018 78    • HDL 05/03/2018 34*   • LDL 05/03/2018 59    • GFR If  05/03/2018 >60    • GFR If Non  Ameri* 05/03/2018 >60          clinical course has been stable    Past Medical History:   Diagnosis Date   • Allergic rhinitis, cause unspecified    • Anxiety state, unspecified    • Degeneration of lumbar or lumbosacral intervertebral disc    • Depressive disorder, not elsewhere classified    • Esophageal reflux    • Essential hypertension, malignant    • Generalized anxiety disorder 11/10/2009   • History of kidney stones    • Hypertrophy of prostate with urinary obstruction and other lower urinary tract symptoms (LUTS)    • Ingrowing nail     RT GREAT TOE   • Insomnia, unspecified    • Right bundle branch block and left anterior fascicular block    • SOB (shortness of breath) 10/24/2014   • Unspecified deficiency anemia    • Unspecified hemorrhagic conditions     nose bleeds       Past Surgical History:   Procedure Laterality Date   • PARATHYROID EXPLORATION  7/25/2012    Performed by GURVINDER KENDALL at SURGERY SAME DAY [facility] ORS   • LUMBAR LAMINECTOMY DISKECTOMY  2009   • CERVICAL DISK AND FUSION ANTERIOR  2009  "  • INGUINAL HERNIA REPAIR      left   • LITHOTRIPSY      \"several years ago\"       Family History   Problem Relation Age of Onset   • Cancer Mother      LIVER   • Hypertension Mother    • No Known Problems Father    • No Known Problems Sister    • No Known Problems Brother        Xanax [alprazolam]    Current Outpatient Prescriptions Ordered in [facility]   Medication Sig Dispense Refill   • fluticasone (FLONASE) 50 MCG/ACT nasal spray Spray 1 Spray in nose 2 times a day. 2 Bottle 6   • Levocetirizine Dihydrochloride 5 MG Tab Take 1 Tab by mouth every day. 90 Tab 3   • sertraline (ZOLOFT) 100 MG Tab TAKE 2 TABLETS BY MOUTH DAILY. 60 Tab 0   • lisinopril (PRINIVIL, ZESTRIL) 40 MG tablet TAKE 1 TABLET BY MOUTH DAILY. 90 Tab 0   • terazosin (HYTRIN) 5 MG Cap Take 1 Cap by mouth every day. 90 Cap 0   • Multiple Vitamins-Minerals (CENTRUM SILVER ADULT 50+ PO) Take  by mouth.     • omeprazole (PRILOSEC) 20 MG delayed-release capsule Take 1 Cap by mouth every day. 90 Cap 1     No current Epic-ordered facility-administered medications on file.        Constitutional ROS: No unexpected change in weight, No weakness  Pulmonary ROS: Positive per HPI  Cardiovascular ROS: No chest pain, No edema, No palpitations, Positive for hypertension   Gastrointestinal ROS: No abdominal pain, No nausea, vomiting, diarrhea, or constipation  Musculoskeletal/Extremities ROS: No clubbing, No peripheral edema, No pain, redness or swelling on the joints  Hematologic/Lymphatic ROS: Positive for anemia  Neurologic ROS: Normal development, No seizures, No weakness    Physical exam:  /60   Pulse 82   Temp 36.7 °C (98 °F)   Resp 16   Ht 1.765 m (5' 9.5\")   Wt 83 kg (183 lb)   SpO2 98%   BMI 26.64 kg/m²   General Appearance: Elderly male, alert, no distress, mildly overweight, well-groomed  Skin: Skin color, texture, turgor normal. No rashes or lesions.  Eyes: conjunctivae/corneas clear. PERRL, EOM's intact. Fundi benign  Ears: positive " findings: R TM - retracted, L TM - retracted  Nose/Sinuses: positive findings: mucosa erythematous and swollen  Lungs: negative findings: normal respiratory rate and rhythm, lungs clear to auscultation  Heart: negative. RRR without murmur, gallop, or rubs.  No ectopy.  Abdomen: Abdomen soft, non-tender. BS normal. No masses,  No organomegaly  Musculoskeletal: negative findings: no evidence of joint instability, strength normal, no deformities present  Neurologic: intact, CN II through XII grossly intact    Medical decision making/discussion: Will have patient start allergy regimen including Flonase, Xyzal and nasal saline.  He is to have his labs done anytime during lab hours, this will be to assess his anemia.  He is to follow-up with me for his annual wellness exam.    Celio was seen today for cough and hypertension.    Diagnoses and all orders for this visit:    Vitamin D insufficiency    Gastroesophageal reflux disease with esophagitis    Anemia, unspecified type  -     FERRITIN; Future  -     CBC WITH DIFFERENTIAL; Future    Cough    Hyperparathyroidism (HCC)    Allergic rhinitis, unspecified seasonality, unspecified trigger  -     fluticasone (FLONASE) 50 MCG/ACT nasal spray; Spray 1 Spray in nose 2 times a day.  -     Levocetirizine Dihydrochloride 5 MG Tab; Take 1 Tab by mouth every day.          Please note that this dictation was created using voice recognition software. I have made every reasonable attempt to correct obvious errors, but I expect that there are errors of grammar and possibly content that I did not discover before finalizing the note.

## 2018-06-07 NOTE — PATIENT INSTRUCTIONS
Allergy medication daily and add nasal saline (OTC) 2 sprays to each nostril 3-5 times per day    Labs anytime during labs hours

## 2018-06-08 NOTE — ASSESSMENT & PLAN NOTE
This is a chronic, condition stable.  His last vitamin D level was 27 encouraged him to take over-the-counter supplement about 1000 2000 units daily.

## 2018-06-08 NOTE — ASSESSMENT & PLAN NOTE
This is a chronic condition, stable and fairly well controlled on omeprazole 20 mg daily.  He tolerates this well with no significant or bothersome side effects.  He is good about avoiding aggravating foods and activities as much as possible.

## 2018-06-08 NOTE — ASSESSMENT & PLAN NOTE
Pt has history of anemia, he recently had CBC which showed following:  Component      Latest Ref Rng & Units 5/3/2018           7:29 AM   WBC      4.8 - 10.8 K/uL 5.0   RBC      4.70 - 6.10 M/uL 4.33 (L)   Hemoglobin      14.0 - 18.0 g/dL 13.3 (L)   Hematocrit      42.0 - 52.0 % 40.3 (L)   MCV      81.4 - 97.8 fL 93.1   MCH      27.0 - 33.0 pg 30.7   MCHC      33.7 - 35.3 g/dL 33.0 (L)   RDW      35.9 - 50.0 fL 46.4   Platelet Count      164 - 446 K/uL 119 (L)   MPV      9.0 - 12.9 fL 10.4       He states that he has been treated for iron deficiency in the past, new labs have been ordered for him.  He does deny symptoms.

## 2018-06-11 ENCOUNTER — HOSPITAL ENCOUNTER (OUTPATIENT)
Dept: LAB | Facility: MEDICAL CENTER | Age: 73
End: 2018-06-11
Attending: NURSE PRACTITIONER
Payer: MEDICARE

## 2018-06-11 DIAGNOSIS — D64.9 ANEMIA, UNSPECIFIED TYPE: ICD-10-CM

## 2018-06-11 LAB
BASOPHILS # BLD AUTO: 0.7 % (ref 0–1.8)
BASOPHILS # BLD: 0.03 K/UL (ref 0–0.12)
EOSINOPHIL # BLD AUTO: 0.33 K/UL (ref 0–0.51)
EOSINOPHIL NFR BLD: 7.8 % (ref 0–6.9)
ERYTHROCYTE [DISTWIDTH] IN BLOOD BY AUTOMATED COUNT: 44.4 FL (ref 35.9–50)
FERRITIN SERPL-MCNC: 172.4 NG/ML (ref 22–322)
HCT VFR BLD AUTO: 39.9 % (ref 42–52)
HGB BLD-MCNC: 13.4 G/DL (ref 14–18)
IMM GRANULOCYTES # BLD AUTO: 0.01 K/UL (ref 0–0.11)
IMM GRANULOCYTES NFR BLD AUTO: 0.2 % (ref 0–0.9)
LYMPHOCYTES # BLD AUTO: 1.35 K/UL (ref 1–4.8)
LYMPHOCYTES NFR BLD: 32.1 % (ref 22–41)
MCH RBC QN AUTO: 30.7 PG (ref 27–33)
MCHC RBC AUTO-ENTMCNC: 33.6 G/DL (ref 33.7–35.3)
MCV RBC AUTO: 91.3 FL (ref 81.4–97.8)
MONOCYTES # BLD AUTO: 0.31 K/UL (ref 0–0.85)
MONOCYTES NFR BLD AUTO: 7.4 % (ref 0–13.4)
NEUTROPHILS # BLD AUTO: 2.18 K/UL (ref 1.82–7.42)
NEUTROPHILS NFR BLD: 51.8 % (ref 44–72)
NRBC # BLD AUTO: 0 K/UL
NRBC BLD-RTO: 0 /100 WBC
PLATELET # BLD AUTO: 118 K/UL (ref 164–446)
PMV BLD AUTO: 10.3 FL (ref 9–12.9)
RBC # BLD AUTO: 4.37 M/UL (ref 4.7–6.1)
WBC # BLD AUTO: 4.2 K/UL (ref 4.8–10.8)

## 2018-06-11 PROCEDURE — 85025 COMPLETE CBC W/AUTO DIFF WBC: CPT

## 2018-06-11 PROCEDURE — 82728 ASSAY OF FERRITIN: CPT

## 2018-06-11 PROCEDURE — 36415 COLL VENOUS BLD VENIPUNCTURE: CPT

## 2018-06-19 ENCOUNTER — HOSPITAL ENCOUNTER (OUTPATIENT)
Dept: LAB | Facility: MEDICAL CENTER | Age: 73
End: 2018-06-19
Attending: UROLOGY
Payer: MEDICARE

## 2018-06-19 LAB — PSA SERPL-MCNC: 0.7 NG/ML (ref 0–4)

## 2018-06-19 PROCEDURE — 84153 ASSAY OF PSA TOTAL: CPT

## 2018-06-19 PROCEDURE — 36415 COLL VENOUS BLD VENIPUNCTURE: CPT

## 2018-06-27 DIAGNOSIS — I10 ESSENTIAL HYPERTENSION: ICD-10-CM

## 2018-06-27 RX ORDER — SERTRALINE HYDROCHLORIDE 100 MG/1
TABLET, FILM COATED ORAL
Qty: 60 TAB | Refills: 0 | Status: SHIPPED | OUTPATIENT
Start: 2018-06-27 | End: 2018-08-03 | Stop reason: SDUPTHER

## 2018-06-27 RX ORDER — LISINOPRIL 40 MG/1
TABLET ORAL
Qty: 90 TAB | Refills: 1 | Status: SHIPPED | OUTPATIENT
Start: 2018-06-27 | End: 2018-12-24 | Stop reason: SDUPTHER

## 2018-06-27 NOTE — TELEPHONE ENCOUNTER
Was the patient seen in the last year in this department? Yes     Does patient have an active prescription for medications requested? No     Received Request Via: Pharmacy      Pt met protocol?: Yes, labs 5/18, ov 6/18 bp 118/60

## 2018-06-28 NOTE — TELEPHONE ENCOUNTER
Refill X 6 months, sent to pharmacy.Pt. Seen in the last 6 months per protocol.   Lab Results   Component Value Date/Time    SODIUM 139 05/03/2018 07:29 AM    POTASSIUM 4.2 05/03/2018 07:29 AM    CHLORIDE 107 05/03/2018 07:29 AM    CO2 24 05/03/2018 07:29 AM    GLUCOSE 95 05/03/2018 07:29 AM    BUN 15 05/03/2018 07:29 AM    CREATININE 0.87 05/03/2018 07:29 AM    CREATININE 1.0 03/20/2008 02:41 PM       BP Readings from Last 1 Encounters:   06/07/18 118/60

## 2018-07-04 ENCOUNTER — TELEPHONE (OUTPATIENT)
Dept: MEDICAL GROUP | Facility: PHYSICIAN GROUP | Age: 73
End: 2018-07-04

## 2018-07-04 NOTE — TELEPHONE ENCOUNTER
----- Message from Celio Abreu sent at 7/4/2018  5:00 AM PDT -----  Regarding: Results  Celio,  Your labs are stable. There is no evidence of iron deficiency anemia.   Pricilla

## 2018-07-04 NOTE — TELEPHONE ENCOUNTER
I sent the below info to patient via a [employer] message a week ago--pt still has not read message. Please call pt with message. Thank you.

## 2018-07-09 DIAGNOSIS — N20.0 KIDNEY STONES: ICD-10-CM

## 2018-07-09 RX ORDER — TERAZOSIN 5 MG/1
5 CAPSULE ORAL DAILY
Qty: 90 CAP | Refills: 1 | Status: SHIPPED | OUTPATIENT
Start: 2018-07-09 | End: 2019-01-25 | Stop reason: SDUPTHER

## 2018-07-09 NOTE — TELEPHONE ENCOUNTER
Was the patient seen in the last year in this department? Yes     Does patient have an active prescription for medications requested? No     Received Request Via: Pharmacy      Pt met protocol?: Yes, OV 6/18   BP Readings from Last 1 Encounters:   06/07/18 118/60

## 2018-08-06 RX ORDER — SERTRALINE HYDROCHLORIDE 100 MG/1
TABLET, FILM COATED ORAL
Qty: 90 TAB | Refills: 1 | Status: SHIPPED | OUTPATIENT
Start: 2018-08-06 | End: 2018-11-08 | Stop reason: SDUPTHER

## 2018-11-09 RX ORDER — SERTRALINE HYDROCHLORIDE 100 MG/1
TABLET, FILM COATED ORAL
Qty: 90 TAB | Refills: 1 | Status: SHIPPED | OUTPATIENT
Start: 2018-11-09 | End: 2018-11-30

## 2018-11-30 RX ORDER — SERTRALINE HYDROCHLORIDE 100 MG/1
TABLET, FILM COATED ORAL
Qty: 180 TAB | Refills: 1 | Status: SHIPPED | OUTPATIENT
Start: 2018-11-30 | End: 2019-05-14

## 2018-12-24 DIAGNOSIS — I10 ESSENTIAL HYPERTENSION: ICD-10-CM

## 2018-12-24 RX ORDER — LISINOPRIL 40 MG/1
TABLET ORAL
Qty: 90 TAB | Refills: 0 | Status: SHIPPED | OUTPATIENT
Start: 2018-12-24 | End: 2019-03-25 | Stop reason: SDUPTHER

## 2018-12-24 NOTE — TELEPHONE ENCOUNTER
Was the patient seen in the last year in this department? Yes    Does patient have an active prescription for medications requested? No     Received Request Via: Pharmacy      Pt met protocol?: Yes    LAST OV 06/07/2018    BP Readings from Last 1 Encounters:   06/07/18 118/60     Lab Results   Component Value Date/Time    CHOLSTRLTOT 109 05/03/2018 07:29 AM    LDL 59 05/03/2018 07:29 AM    HDL 34 (A) 05/03/2018 07:29 AM    TRIGLYCERIDE 78 05/03/2018 07:29 AM       Lab Results   Component Value Date/Time    SODIUM 139 05/03/2018 07:29 AM    POTASSIUM 4.2 05/03/2018 07:29 AM    CHLORIDE 107 05/03/2018 07:29 AM    CO2 24 05/03/2018 07:29 AM    GLUCOSE 95 05/03/2018 07:29 AM    BUN 15 05/03/2018 07:29 AM    CREATININE 0.87 05/03/2018 07:29 AM    CREATININE 1.0 03/20/2008 02:41 PM     Lab Results   Component Value Date/Time    ALKPHOSPHAT 76 05/03/2018 07:29 AM    ASTSGOT 17 05/03/2018 07:29 AM    ALTSGPT 15 05/03/2018 07:29 AM    TBILIRUBIN 0.5 05/03/2018 07:29 AM

## 2019-03-25 DIAGNOSIS — I10 ESSENTIAL HYPERTENSION: ICD-10-CM

## 2019-03-25 RX ORDER — LISINOPRIL 40 MG/1
TABLET ORAL
Qty: 90 TAB | Refills: 1 | Status: SHIPPED | OUTPATIENT
Start: 2019-03-25 | End: 2019-11-05 | Stop reason: SDUPTHER

## 2019-03-25 NOTE — TELEPHONE ENCOUNTER
Refill X 6 months, sent to pharmacy.Pt. Seen in the last 6 months per protocol.   Lab Results   Component Value Date/Time    SODIUM 139 05/03/2018 07:29 AM    POTASSIUM 4.2 05/03/2018 07:29 AM    CHLORIDE 107 05/03/2018 07:29 AM    CO2 24 05/03/2018 07:29 AM    GLUCOSE 95 05/03/2018 07:29 AM    BUN 15 05/03/2018 07:29 AM    CREATININE 0.87 05/03/2018 07:29 AM    CREATININE 1.0 03/20/2008 02:41 PM

## 2019-03-25 NOTE — TELEPHONE ENCOUNTER
Father states Alvina is not feeling well.  This morning he vomited after he had breakfast.  They checked a temp and was 101.  Treated but spiked again at 12 pm to 102.  He has a slight runny nose.  No sore throat or ear pain.  Father states that he has body aches and is tired.   Father states that they are concerned as they have a 1 month old at home.  Scheduled for an appt at 3:15 today.   Was the patient seen in the last year in this department?   Yes    Does patient have an active prescription for medications requested? No     Received Request Via: Pharmacy

## 2019-04-25 DIAGNOSIS — N20.0 KIDNEY STONES: ICD-10-CM

## 2019-04-25 RX ORDER — TERAZOSIN 5 MG/1
5 CAPSULE ORAL DAILY
Qty: 90 CAP | Refills: 1 | Status: SHIPPED | OUTPATIENT
Start: 2019-04-25 | End: 2019-11-05 | Stop reason: SDUPTHER

## 2019-04-25 NOTE — TELEPHONE ENCOUNTER
Was the patient seen in the last year in this department? Yes    Does patient have an active prescription for medications requested? No     Received Request Via: Pharmacy      Pt met protocol?: Yes, OV 6/18

## 2019-05-14 RX ORDER — SERTRALINE HYDROCHLORIDE 100 MG/1
TABLET, FILM COATED ORAL
Qty: 180 TAB | Refills: 0 | Status: SHIPPED | OUTPATIENT
Start: 2019-05-14 | End: 2019-09-23

## 2019-05-14 NOTE — TELEPHONE ENCOUNTER
Was the patient seen in the last year in this department? Yes    Does patient have an active prescription for medications requested? No     Received Request Via: Pharmacy    Pt met protocol?: No     Last OV 06/07/2018

## 2019-06-06 ENCOUNTER — APPOINTMENT (OUTPATIENT)
Dept: RADIOLOGY | Facility: MEDICAL CENTER | Age: 74
DRG: 480 | End: 2019-06-06
Attending: EMERGENCY MEDICINE
Payer: MEDICARE

## 2019-06-06 ENCOUNTER — HOSPITAL ENCOUNTER (INPATIENT)
Facility: MEDICAL CENTER | Age: 74
LOS: 6 days | DRG: 480 | End: 2019-06-12
Attending: EMERGENCY MEDICINE | Admitting: HOSPITALIST
Payer: MEDICARE
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
## 2019-06-06 DIAGNOSIS — S72.001A CLOSED RIGHT HIP FRACTURE, INITIAL ENCOUNTER (HCC): Primary | ICD-10-CM

## 2019-06-06 DIAGNOSIS — S72.001A CLOSED FRACTURE OF RIGHT HIP, INITIAL ENCOUNTER (HCC): ICD-10-CM

## 2019-06-06 PROBLEM — N40.0 BENIGN PROSTATIC HYPERPLASIA WITHOUT LOWER URINARY TRACT SYMPTOMS: Status: ACTIVE | Noted: 2019-06-06

## 2019-06-06 PROBLEM — I45.2 RBBB (RIGHT BUNDLE BRANCH BLOCK WITH LEFT ANTERIOR FASCICULAR BLOCK): Status: ACTIVE | Noted: 2019-06-06

## 2019-06-06 LAB
ALBUMIN SERPL BCP-MCNC: 4 G/DL (ref 3.2–4.9)
ALBUMIN/GLOB SERPL: 1.8 G/DL
ALP SERPL-CCNC: 69 U/L (ref 30–99)
ALT SERPL-CCNC: 18 U/L (ref 2–50)
ANION GAP SERPL CALC-SCNC: 6 MMOL/L (ref 0–11.9)
APTT PPP: 25.4 SEC (ref 24.7–36)
AST SERPL-CCNC: 28 U/L (ref 12–45)
BASOPHILS # BLD AUTO: 0.3 % (ref 0–1.8)
BASOPHILS # BLD: 0.02 K/UL (ref 0–0.12)
BILIRUB SERPL-MCNC: 1 MG/DL (ref 0.1–1.5)
BUN SERPL-MCNC: 19 MG/DL (ref 8–22)
CALCIUM SERPL-MCNC: 10.1 MG/DL (ref 8.5–10.5)
CHLORIDE SERPL-SCNC: 112 MMOL/L (ref 96–112)
CO2 SERPL-SCNC: 22 MMOL/L (ref 20–33)
CREAT SERPL-MCNC: 1.22 MG/DL (ref 0.5–1.4)
EKG IMPRESSION: NORMAL
EOSINOPHIL # BLD AUTO: 0.07 K/UL (ref 0–0.51)
EOSINOPHIL NFR BLD: 0.9 % (ref 0–6.9)
ERYTHROCYTE [DISTWIDTH] IN BLOOD BY AUTOMATED COUNT: 47.5 FL (ref 35.9–50)
GLOBULIN SER CALC-MCNC: 2.2 G/DL (ref 1.9–3.5)
GLUCOSE SERPL-MCNC: 101 MG/DL (ref 65–99)
HCT VFR BLD AUTO: 37.2 % (ref 42–52)
HGB BLD-MCNC: 12.4 G/DL (ref 14–18)
IMM GRANULOCYTES # BLD AUTO: 0.04 K/UL (ref 0–0.11)
IMM GRANULOCYTES NFR BLD AUTO: 0.5 % (ref 0–0.9)
INR PPP: 1.25 (ref 0.87–1.13)
LYMPHOCYTES # BLD AUTO: 0.86 K/UL (ref 1–4.8)
LYMPHOCYTES NFR BLD: 10.9 % (ref 22–41)
MCH RBC QN AUTO: 31.5 PG (ref 27–33)
MCHC RBC AUTO-ENTMCNC: 33.3 G/DL (ref 33.7–35.3)
MCV RBC AUTO: 94.4 FL (ref 81.4–97.8)
MONOCYTES # BLD AUTO: 0.41 K/UL (ref 0–0.85)
MONOCYTES NFR BLD AUTO: 5.2 % (ref 0–13.4)
NEUTROPHILS # BLD AUTO: 6.46 K/UL (ref 1.82–7.42)
NEUTROPHILS NFR BLD: 82.2 % (ref 44–72)
NRBC # BLD AUTO: 0 K/UL
NRBC BLD-RTO: 0 /100 WBC
PLATELET # BLD AUTO: 119 K/UL (ref 164–446)
PMV BLD AUTO: 10.2 FL (ref 9–12.9)
POTASSIUM SERPL-SCNC: 4.5 MMOL/L (ref 3.6–5.5)
PROT SERPL-MCNC: 6.2 G/DL (ref 6–8.2)
PROTHROMBIN TIME: 16 SEC (ref 12–14.6)
RBC # BLD AUTO: 3.94 M/UL (ref 4.7–6.1)
SODIUM SERPL-SCNC: 140 MMOL/L (ref 135–145)
WBC # BLD AUTO: 7.9 K/UL (ref 4.8–10.8)

## 2019-06-06 PROCEDURE — 36415 COLL VENOUS BLD VENIPUNCTURE: CPT

## 2019-06-06 PROCEDURE — 71045 X-RAY EXAM CHEST 1 VIEW: CPT

## 2019-06-06 PROCEDURE — A9270 NON-COVERED ITEM OR SERVICE: HCPCS | Performed by: HOSPITALIST

## 2019-06-06 PROCEDURE — 80053 COMPREHEN METABOLIC PANEL: CPT

## 2019-06-06 PROCEDURE — 700105 HCHG RX REV CODE 258: Performed by: HOSPITALIST

## 2019-06-06 PROCEDURE — 99285 EMERGENCY DEPT VISIT HI MDM: CPT

## 2019-06-06 PROCEDURE — 93005 ELECTROCARDIOGRAM TRACING: CPT | Performed by: EMERGENCY MEDICINE

## 2019-06-06 PROCEDURE — 99223 1ST HOSP IP/OBS HIGH 75: CPT | Performed by: HOSPITALIST

## 2019-06-06 PROCEDURE — 96376 TX/PRO/DX INJ SAME DRUG ADON: CPT

## 2019-06-06 PROCEDURE — 304561 HCHG STAT O2

## 2019-06-06 PROCEDURE — 85730 THROMBOPLASTIN TIME PARTIAL: CPT

## 2019-06-06 PROCEDURE — 85610 PROTHROMBIN TIME: CPT

## 2019-06-06 PROCEDURE — 700111 HCHG RX REV CODE 636 W/ 250 OVERRIDE (IP): Performed by: HOSPITALIST

## 2019-06-06 PROCEDURE — 73552 X-RAY EXAM OF FEMUR 2/>: CPT | Mod: RT

## 2019-06-06 PROCEDURE — 85025 COMPLETE CBC W/AUTO DIFF WBC: CPT

## 2019-06-06 PROCEDURE — 700102 HCHG RX REV CODE 250 W/ 637 OVERRIDE(OP): Performed by: HOSPITALIST

## 2019-06-06 PROCEDURE — 770006 HCHG ROOM/CARE - MED/SURG/GYN SEMI*

## 2019-06-06 PROCEDURE — 72170 X-RAY EXAM OF PELVIS: CPT

## 2019-06-06 PROCEDURE — 96374 THER/PROPH/DIAG INJ IV PUSH: CPT

## 2019-06-06 PROCEDURE — 700105 HCHG RX REV CODE 258: Performed by: EMERGENCY MEDICINE

## 2019-06-06 RX ORDER — SERTRALINE HYDROCHLORIDE 100 MG/1
200 TABLET, FILM COATED ORAL
Status: DISCONTINUED | OUTPATIENT
Start: 2019-06-06 | End: 2019-06-12 | Stop reason: HOSPADM

## 2019-06-06 RX ORDER — OXYCODONE HYDROCHLORIDE 5 MG/1
5-10 TABLET ORAL EVERY 4 HOURS PRN
Status: DISCONTINUED | OUTPATIENT
Start: 2019-06-06 | End: 2019-06-11

## 2019-06-06 RX ORDER — POLYETHYLENE GLYCOL 3350 17 G/17G
1 POWDER, FOR SOLUTION ORAL
Status: DISCONTINUED | OUTPATIENT
Start: 2019-06-06 | End: 2019-06-12 | Stop reason: HOSPADM

## 2019-06-06 RX ORDER — ONDANSETRON 2 MG/ML
4 INJECTION INTRAMUSCULAR; INTRAVENOUS EVERY 4 HOURS PRN
Status: DISCONTINUED | OUTPATIENT
Start: 2019-06-06 | End: 2019-06-12 | Stop reason: HOSPADM

## 2019-06-06 RX ORDER — LISINOPRIL 20 MG/1
40 TABLET ORAL
Status: DISCONTINUED | OUTPATIENT
Start: 2019-06-06 | End: 2019-06-08

## 2019-06-06 RX ORDER — MORPHINE SULFATE 4 MG/ML
4 INJECTION, SOLUTION INTRAMUSCULAR; INTRAVENOUS ONCE
Status: DISCONTINUED | OUTPATIENT
Start: 2019-06-06 | End: 2019-06-06

## 2019-06-06 RX ORDER — MORPHINE SULFATE 4 MG/ML
1-4 INJECTION, SOLUTION INTRAMUSCULAR; INTRAVENOUS
Status: DISCONTINUED | OUTPATIENT
Start: 2019-06-06 | End: 2019-06-12 | Stop reason: HOSPADM

## 2019-06-06 RX ORDER — ACETAMINOPHEN 325 MG/1
650 TABLET ORAL EVERY 6 HOURS PRN
Status: DISCONTINUED | OUTPATIENT
Start: 2019-06-06 | End: 2019-06-12 | Stop reason: HOSPADM

## 2019-06-06 RX ORDER — TERAZOSIN 5 MG/1
5 CAPSULE ORAL
Status: DISCONTINUED | OUTPATIENT
Start: 2019-06-06 | End: 2019-06-12 | Stop reason: HOSPADM

## 2019-06-06 RX ORDER — ONDANSETRON 2 MG/ML
4 INJECTION INTRAMUSCULAR; INTRAVENOUS ONCE
Status: DISCONTINUED | OUTPATIENT
Start: 2019-06-06 | End: 2019-06-06

## 2019-06-06 RX ORDER — BISACODYL 10 MG
10 SUPPOSITORY, RECTAL RECTAL
Status: DISCONTINUED | OUTPATIENT
Start: 2019-06-06 | End: 2019-06-12 | Stop reason: HOSPADM

## 2019-06-06 RX ORDER — SODIUM CHLORIDE, SODIUM LACTATE, POTASSIUM CHLORIDE, CALCIUM CHLORIDE 600; 310; 30; 20 MG/100ML; MG/100ML; MG/100ML; MG/100ML
INJECTION, SOLUTION INTRAVENOUS CONTINUOUS
Status: DISCONTINUED | OUTPATIENT
Start: 2019-06-06 | End: 2019-06-10

## 2019-06-06 RX ORDER — ONDANSETRON 4 MG/1
4 TABLET, ORALLY DISINTEGRATING ORAL EVERY 4 HOURS PRN
Status: DISCONTINUED | OUTPATIENT
Start: 2019-06-06 | End: 2019-06-12 | Stop reason: HOSPADM

## 2019-06-06 RX ORDER — AMOXICILLIN 250 MG
2 CAPSULE ORAL 2 TIMES DAILY
Status: DISCONTINUED | OUTPATIENT
Start: 2019-06-06 | End: 2019-06-12 | Stop reason: HOSPADM

## 2019-06-06 RX ORDER — SODIUM CHLORIDE 9 MG/ML
INJECTION, SOLUTION INTRAVENOUS CONTINUOUS
Status: DISCONTINUED | OUTPATIENT
Start: 2019-06-06 | End: 2019-06-06

## 2019-06-06 RX ADMIN — TERAZOSIN HYDROCHLORIDE 5 MG: 5 CAPSULE ORAL at 22:20

## 2019-06-06 RX ADMIN — SERTRALINE HYDROCHLORIDE 200 MG: 100 TABLET ORAL at 22:20

## 2019-06-06 RX ADMIN — OXYCODONE HYDROCHLORIDE 10 MG: 5 TABLET ORAL at 22:20

## 2019-06-06 RX ADMIN — SODIUM CHLORIDE: 9 INJECTION, SOLUTION INTRAVENOUS at 13:30

## 2019-06-06 RX ADMIN — SODIUM CHLORIDE, POTASSIUM CHLORIDE, SODIUM LACTATE AND CALCIUM CHLORIDE: 600; 310; 30; 20 INJECTION, SOLUTION INTRAVENOUS at 17:58

## 2019-06-06 RX ADMIN — OXYCODONE HYDROCHLORIDE 10 MG: 5 TABLET ORAL at 17:58

## 2019-06-06 RX ADMIN — MORPHINE SULFATE 4 MG: 4 INJECTION INTRAVENOUS at 18:43

## 2019-06-06 RX ADMIN — MORPHINE SULFATE 4 MG: 4 INJECTION INTRAVENOUS at 16:42

## 2019-06-06 RX ADMIN — LISINOPRIL 40 MG: 20 TABLET ORAL at 22:20

## 2019-06-06 RX ADMIN — MORPHINE SULFATE 4 MG: 4 INJECTION INTRAVENOUS at 20:02

## 2019-06-06 ASSESSMENT — COPD QUESTIONNAIRES
DO YOU EVER COUGH UP ANY MUCUS OR PHLEGM?: YES, A FEW DAYS A WEEK OR MONTH
COPD SCREENING SCORE: 6
DURING THE PAST 4 WEEKS HOW MUCH DID YOU FEEL SHORT OF BREATH: SOME OF THE TIME
HAVE YOU SMOKED AT LEAST 100 CIGARETTES IN YOUR ENTIRE LIFE: YES
IN THE PAST 12 MONTHS DO YOU DO LESS THAN YOU USED TO BECAUSE OF YOUR BREATHING PROBLEMS: DISAGREE/UNSURE

## 2019-06-06 ASSESSMENT — PATIENT HEALTH QUESTIONNAIRE - PHQ9
5. POOR APPETITE OR OVEREATING: NOT AT ALL
3. TROUBLE FALLING OR STAYING ASLEEP OR SLEEPING TOO MUCH: NOT AT ALL
7. TROUBLE CONCENTRATING ON THINGS, SUCH AS READING THE NEWSPAPER OR WATCHING TELEVISION: NOT AT ALL
4. FEELING TIRED OR HAVING LITTLE ENERGY: NOT AT ALL
6. FEELING BAD ABOUT YOURSELF - OR THAT YOU ARE A FAILURE OR HAVE LET YOURSELF OR YOUR FAMILY DOWN: NOT AL ALL
SUM OF ALL RESPONSES TO PHQ QUESTIONS 1-9: 1
9. THOUGHTS THAT YOU WOULD BE BETTER OFF DEAD, OR OF HURTING YOURSELF: NOT AT ALL
8. MOVING OR SPEAKING SO SLOWLY THAT OTHER PEOPLE COULD HAVE NOTICED. OR THE OPPOSITE, BEING SO FIGETY OR RESTLESS THAT YOU HAVE BEEN MOVING AROUND A LOT MORE THAN USUAL: NOT AT ALL
SUM OF ALL RESPONSES TO PHQ9 QUESTIONS 1 AND 2: 1
1. LITTLE INTEREST OR PLEASURE IN DOING THINGS: NOT AT ALL
2. FEELING DOWN, DEPRESSED, IRRITABLE, OR HOPELESS: SEVERAL DAYS

## 2019-06-06 ASSESSMENT — COGNITIVE AND FUNCTIONAL STATUS - GENERAL
MOVING TO AND FROM BED TO CHAIR: A LOT
SUGGESTED CMS G CODE MODIFIER DAILY ACTIVITY: CK
TURNING FROM BACK TO SIDE WHILE IN FLAT BAD: A LITTLE
HELP NEEDED FOR BATHING: A LOT
CLIMB 3 TO 5 STEPS WITH RAILING: A LOT
DRESSING REGULAR LOWER BODY CLOTHING: A LOT
MOBILITY SCORE: 13
MOVING FROM LYING ON BACK TO SITTING ON SIDE OF FLAT BED: A LOT
WALKING IN HOSPITAL ROOM: A LOT
SUGGESTED CMS G CODE MODIFIER MOBILITY: CL
TOILETING: A LITTLE
DAILY ACTIVITIY SCORE: 19
STANDING UP FROM CHAIR USING ARMS: A LOT

## 2019-06-06 ASSESSMENT — ENCOUNTER SYMPTOMS
SHORTNESS OF BREATH: 0
FEVER: 0
CHILLS: 0
PALPITATIONS: 0

## 2019-06-06 ASSESSMENT — LIFESTYLE VARIABLES
EVER_SMOKED: YES
ALCOHOL_USE: NO

## 2019-06-06 ASSESSMENT — PAIN SCALES - WONG BAKER: WONGBAKER_NUMERICALRESPONSE: HURTS EVEN MORE

## 2019-06-06 NOTE — ED NOTES
Back from xray, erp notified that morphine was not given d/t hypotension. NS infusing.  Pt updated about the poc of admission and talked to pt and wife regarding results.

## 2019-06-06 NOTE — ASSESSMENT & PLAN NOTE
Secondary to a ground level fall  s/p IM nailing POD#3   SNF referral in.  Dr. Sheldon, orthopedics, has been consulted.  Multi modal pain therapy
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
## 2019-06-06 NOTE — H&P
"Hospital Medicine History & Physical Note    Date of Service  6/6/2019    Primary Care Physician  Pricilla Orozco, AMARIS.    Consultants  I discussed with Dr. Sheldon, orthopedics.    Code Status  full    Chief Complaint  Hip pain    History of Presenting Illness  74 y.o. male who presented 6/6/2019 with a ground-level fall and severe right hip pain.  Mr. Abreu has a past medical history of hypertension that has been his usual state of good health until this morning he was helping a friend unload tree branches from the trunk and he was \"yanking on a branch\" when the branch broke and he fell backwards landing on the right hip.  He had severe pain and was not able to walk because of it.  He denies any other trauma specifically no head trauma no loss of consciousness.  Paramedics were called and he was brought to the emergency room where x-ray confirms fracture of the right hip.  Is requiring IV narcotic pain medicines due to the pain in the emergency room his blood pressure is low in the 90s over 60s presumably because of this.  His wife and granddaughter are at bedside and we discussed the need for him to have surgery which will either be done this evening as he ate at 8:45 this morning or possibly tomorrow morning and they are aware that be kept n.p.o. until decision is made.  In the emergency room, EKG reveals a right bundle branch block and left anterior fascicular block though we do not have a previous EKG that we can open up to compare to.  It is noted though that he is his previous history he has mention of both his conditions.  Discussed with Dr. Lopez cardiology and he recommends outpatient follow-up with his condition.    Review of Systems  Review of Systems   Constitutional: Negative for chills and fever.   Respiratory: Negative for shortness of breath.    Cardiovascular: Negative for chest pain and palpitations.   All other systems reviewed and are negative.      Past Medical History   has a past " medical history of Allergic rhinitis, cause unspecified; Anxiety state, unspecified; Degeneration of lumbar or lumbosacral intervertebral disc; Depressive disorder, not elsewhere classified; Esophageal reflux; Essential hypertension, malignant; Generalized anxiety disorder (11/10/2009); History of kidney stones; Hypertrophy of prostate with urinary obstruction and other lower urinary tract symptoms (LUTS); Ingrowing nail; Insomnia, unspecified; Right bundle branch block and left anterior fascicular block; SOB (shortness of breath) (10/24/2014); Unspecified deficiency anemia; and Unspecified hemorrhagic conditions.    Surgical History   has a past surgical history that includes lumbar laminectomy diskectomy (2009); cervical disk and fusion anterior (2009); lithotripsy; inguinal hernia repair; and parathyroid exploration (7/25/2012).     Family History  No family history of heart disease nor sudden death    Social History  He lives in [facility] with his wife and does not drink nor smoke    Allergies  Allergies   Allergen Reactions   • Xanax [Alprazolam]      confusion       Medications  Prior to Admission Medications   Prescriptions Last Dose Informant Patient Reported? Taking?   Multiple Vitamins-Minerals (CENTRUM SILVER ADULT 50+ PO) 6/5/2019 at PM Patient Yes No   Sig: Take 1 Tab by mouth every day.   lisinopril (PRINIVIL, ZESTRIL) 40 MG tablet 6/5/2019 at PM Patient No No   Sig: TAKE 1 TABLET BY MOUTH DAILY.   sertraline (ZOLOFT) 100 MG Tab 6/5/2019 at PM Patient No No   Sig: TAKE TWO TABLETS BY MOUTH EVERY DAY   terazosin (HYTRIN) 5 MG Cap 6/5/2019 at PM Patient No No   Sig: Take 1 Cap by mouth every day.      Facility-Administered Medications: None       Physical Exam  Temp:  [36.5 °C (97.7 °F)] 36.5 °C (97.7 °F)  Pulse:  [59-70] 69  Resp:  [14-18] 14  BP: (96)/(40) 96/40  SpO2:  [95 %-99 %] 99 %    Physical Exam   Constitutional: He is oriented to person, place, and time. No distress.   HENT:   Head:  Normocephalic and atraumatic.   Dry mucous membranes.    Eyes: Right eye exhibits no discharge. Left eye exhibits no discharge. No scleral icterus.   Neck: Normal range of motion. Neck supple.   Cardiovascular: Normal rate and regular rhythm.    No murmur heard.  Pulmonary/Chest: Effort normal. No respiratory distress. He has no wheezes.   Abdominal: Soft. He exhibits no distension.   Musculoskeletal: He exhibits no edema or tenderness.   He does not move the right hip due to pain   Neurological: He is alert and oriented to person, place, and time.   Skin: Skin is warm. No rash noted. He is not diaphoretic.   Abrasion on the the elbow is wrapped   Psychiatric: He has a normal mood and affect. His behavior is normal.   Speech is clear and concise   Nursing note and vitals reviewed.      Laboratory:  Recent Labs      06/06/19   1245   WBC  7.9   RBC  3.94*   HEMOGLOBIN  12.4*   HEMATOCRIT  37.2*   MCV  94.4   MCH  31.5   MCHC  33.3*   RDW  47.5   PLATELETCT  119*   MPV  10.2     Recent Labs      06/06/19   1245   SODIUM  140   POTASSIUM  4.5   CHLORIDE  112   CO2  22   GLUCOSE  101*   BUN  19   CREATININE  1.22   CALCIUM  10.1     Recent Labs      06/06/19   1245   ALTSGPT  18   ASTSGOT  28   ALKPHOSPHAT  69   TBILIRUBIN  1.0   GLUCOSE  101*     Recent Labs      06/06/19   1245   APTT  25.4   INR  1.25*             No results for input(s): TROPONINI in the last 72 hours.    Urinalysis:    No results found     Imaging:  DX-PELVIS-1 OR 2 VIEWS   Final Result      Nondisplaced intertrochanteric right femoral fracture.      Mild degenerative changes of the hips.      Degenerative changes in the visualized lower lumbar spine.         DX-FEMUR-2+ RIGHT   Final Result      Nondisplaced intertrochanteric right femoral fracture.         DX-CHEST-LIMITED (1 VIEW)   Final Result      Mild hypoinflation atelectasis.               Assessment/Plan:  I anticipate this patient will require at least two midnights for appropriate  medical management, necessitating inpatient admission.    * Closed right hip fracture, initial encounter (HCC)- (present on admission)   Assessment & Plan    Secondary to a ground level fall  He is NPO for surgery  Dr. Sheldon, orthopedics, has been consulted.  IV fluids while NPO  IV morphine for pain.      Benign prostatic hyperplasia without lower urinary tract symptoms- (present on admission)   Assessment & Plan    Continue outpatient Hytrin     RBBB (right bundle branch block with left anterior fascicular block)- (present on admission)   Assessment & Plan    Noted on EKG  There is no previous EKG to refer to  Dr. Lopez, cardiology, has reviewed and recommends outpatient follow up as he has not been symptomatic--no dizziness, syncope, nor near-syncope.     HTN (hypertension)- (present on admission)   Assessment & Plan    Continue lisinopril 40 mg daily with holding parameters.          VTE prophylaxis: SCDs

## 2019-06-06 NOTE — ASSESSMENT & PLAN NOTE
Noted on EKG  There is no previous EKG to refer to  Dr. Lopez, cardiology, has reviewed and recommends outpatient follow up as he has not been symptomatic  Has no dizziness, syncope, nor near-syncope
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
## 2019-06-07 ENCOUNTER — ANESTHESIA EVENT (OUTPATIENT)
Dept: SURGERY | Facility: MEDICAL CENTER | Age: 74
DRG: 480 | End: 2019-06-07
Payer: MEDICARE

## 2019-06-07 ENCOUNTER — APPOINTMENT (OUTPATIENT)
Dept: RADIOLOGY | Facility: MEDICAL CENTER | Age: 74
DRG: 480 | End: 2019-06-07
Attending: ORTHOPAEDIC SURGERY
Payer: MEDICARE

## 2019-06-07 ENCOUNTER — ANESTHESIA (OUTPATIENT)
Dept: SURGERY | Facility: MEDICAL CENTER | Age: 74
DRG: 480 | End: 2019-06-07
Payer: MEDICARE

## 2019-06-07 PROCEDURE — 700102 HCHG RX REV CODE 250 W/ 637 OVERRIDE(OP): Performed by: HOSPITALIST

## 2019-06-07 PROCEDURE — A9270 NON-COVERED ITEM OR SERVICE: HCPCS | Performed by: HOSPITALIST

## 2019-06-07 PROCEDURE — 700111 HCHG RX REV CODE 636 W/ 250 OVERRIDE (IP): Performed by: HOSPITALIST

## 2019-06-07 PROCEDURE — 770006 HCHG ROOM/CARE - MED/SURG/GYN SEMI*

## 2019-06-07 PROCEDURE — 160035 HCHG PACU - 1ST 60 MINS PHASE I: Performed by: ORTHOPAEDIC SURGERY

## 2019-06-07 PROCEDURE — 500891 HCHG PACK, ORTHO MAJOR: Performed by: ORTHOPAEDIC SURGERY

## 2019-06-07 PROCEDURE — 700102 HCHG RX REV CODE 250 W/ 637 OVERRIDE(OP): Performed by: ANESTHESIOLOGY

## 2019-06-07 PROCEDURE — 99232 SBSQ HOSP IP/OBS MODERATE 35: CPT | Performed by: INTERNAL MEDICINE

## 2019-06-07 PROCEDURE — A6454 SELF-ADHER BAND W>=3" <5"/YD: HCPCS | Performed by: ORTHOPAEDIC SURGERY

## 2019-06-07 PROCEDURE — C1713 ANCHOR/SCREW BN/BN,TIS/BN: HCPCS | Performed by: ORTHOPAEDIC SURGERY

## 2019-06-07 PROCEDURE — 700111 HCHG RX REV CODE 636 W/ 250 OVERRIDE (IP): Performed by: ORTHOPAEDIC SURGERY

## 2019-06-07 PROCEDURE — A9270 NON-COVERED ITEM OR SERVICE: HCPCS | Performed by: ANESTHESIOLOGY

## 2019-06-07 PROCEDURE — 160041 HCHG SURGERY MINUTES - EA ADDL 1 MIN LEVEL 4: Performed by: ORTHOPAEDIC SURGERY

## 2019-06-07 PROCEDURE — 501838 HCHG SUTURE GENERAL: Performed by: ORTHOPAEDIC SURGERY

## 2019-06-07 PROCEDURE — 502000 HCHG MISC OR IMPLANTS RC 0278: Performed by: ORTHOPAEDIC SURGERY

## 2019-06-07 PROCEDURE — 73552 X-RAY EXAM OF FEMUR 2/>: CPT | Mod: RT

## 2019-06-07 PROCEDURE — 160009 HCHG ANES TIME/MIN: Performed by: ORTHOPAEDIC SURGERY

## 2019-06-07 PROCEDURE — 700101 HCHG RX REV CODE 250: Performed by: ANESTHESIOLOGY

## 2019-06-07 PROCEDURE — 97165 OT EVAL LOW COMPLEX 30 MIN: CPT

## 2019-06-07 PROCEDURE — 0QS606Z REPOSITION RIGHT UPPER FEMUR WITH INTRAMEDULLARY INTERNAL FIXATION DEVICE, OPEN APPROACH: ICD-10-PCS | Performed by: ORTHOPAEDIC SURGERY

## 2019-06-07 PROCEDURE — 160036 HCHG PACU - EA ADDL 30 MINS PHASE I: Performed by: ORTHOPAEDIC SURGERY

## 2019-06-07 PROCEDURE — A4314 CATH W/DRAINAGE 2-WAY LATEX: HCPCS | Performed by: ORTHOPAEDIC SURGERY

## 2019-06-07 PROCEDURE — 160002 HCHG RECOVERY MINUTES (STAT): Performed by: ORTHOPAEDIC SURGERY

## 2019-06-07 PROCEDURE — 160029 HCHG SURGERY MINUTES - 1ST 30 MINS LEVEL 4: Performed by: ORTHOPAEDIC SURGERY

## 2019-06-07 PROCEDURE — 160048 HCHG OR STATISTICAL LEVEL 1-5: Performed by: ORTHOPAEDIC SURGERY

## 2019-06-07 PROCEDURE — 700111 HCHG RX REV CODE 636 W/ 250 OVERRIDE (IP): Performed by: ANESTHESIOLOGY

## 2019-06-07 PROCEDURE — 700105 HCHG RX REV CODE 258: Performed by: HOSPITALIST

## 2019-06-07 PROCEDURE — 500424 HCHG DRESSING, AIRSTRIP: Performed by: ORTHOPAEDIC SURGERY

## 2019-06-07 DEVICE — IMPLANTABLE DEVICE: Type: IMPLANTABLE DEVICE | Site: HIP | Status: FUNCTIONAL

## 2019-06-07 DEVICE — SCREW FOR IM NAILS TI LOCKING WITH T25 STARDRIVE 5.0MM 46MM (2TX2=4): Type: IMPLANTABLE DEVICE | Site: HIP | Status: FUNCTIONAL

## 2019-06-07 RX ORDER — OXYCODONE HCL 5 MG/5 ML
10 SOLUTION, ORAL ORAL
Status: COMPLETED | OUTPATIENT
Start: 2019-06-07 | End: 2019-06-07

## 2019-06-07 RX ORDER — CEFAZOLIN SODIUM 1 G/3ML
INJECTION, POWDER, FOR SOLUTION INTRAMUSCULAR; INTRAVENOUS PRN
Status: DISCONTINUED | OUTPATIENT
Start: 2019-06-07 | End: 2019-06-07 | Stop reason: SURG

## 2019-06-07 RX ORDER — HYDROMORPHONE HYDROCHLORIDE 1 MG/ML
0.1 INJECTION, SOLUTION INTRAMUSCULAR; INTRAVENOUS; SUBCUTANEOUS
Status: DISCONTINUED | OUTPATIENT
Start: 2019-06-07 | End: 2019-06-07 | Stop reason: HOSPADM

## 2019-06-07 RX ORDER — CEFAZOLIN SODIUM 2 G/100ML
2 INJECTION, SOLUTION INTRAVENOUS EVERY 8 HOURS
Status: COMPLETED | OUTPATIENT
Start: 2019-06-07 | End: 2019-06-08

## 2019-06-07 RX ORDER — ONDANSETRON 2 MG/ML
4 INJECTION INTRAMUSCULAR; INTRAVENOUS
Status: DISCONTINUED | OUTPATIENT
Start: 2019-06-07 | End: 2019-06-07 | Stop reason: HOSPADM

## 2019-06-07 RX ORDER — MEPERIDINE HYDROCHLORIDE 25 MG/ML
6.25 INJECTION INTRAMUSCULAR; INTRAVENOUS; SUBCUTANEOUS
Status: DISCONTINUED | OUTPATIENT
Start: 2019-06-07 | End: 2019-06-07 | Stop reason: HOSPADM

## 2019-06-07 RX ORDER — SODIUM CHLORIDE, SODIUM LACTATE, POTASSIUM CHLORIDE, CALCIUM CHLORIDE 600; 310; 30; 20 MG/100ML; MG/100ML; MG/100ML; MG/100ML
INJECTION, SOLUTION INTRAVENOUS CONTINUOUS
Status: DISCONTINUED | OUTPATIENT
Start: 2019-06-07 | End: 2019-06-07 | Stop reason: HOSPADM

## 2019-06-07 RX ORDER — CELECOXIB 100 MG/1
CAPSULE ORAL PRN
Status: DISCONTINUED | OUTPATIENT
Start: 2019-06-07 | End: 2019-06-07 | Stop reason: SURG

## 2019-06-07 RX ORDER — CELECOXIB 200 MG/1
CAPSULE ORAL
Status: COMPLETED
Start: 2019-06-07 | End: 2019-06-07

## 2019-06-07 RX ORDER — HYDROMORPHONE HYDROCHLORIDE 1 MG/ML
0.2 INJECTION, SOLUTION INTRAMUSCULAR; INTRAVENOUS; SUBCUTANEOUS
Status: DISCONTINUED | OUTPATIENT
Start: 2019-06-07 | End: 2019-06-07 | Stop reason: HOSPADM

## 2019-06-07 RX ORDER — OXYCODONE HCL 5 MG/5 ML
5 SOLUTION, ORAL ORAL
Status: COMPLETED | OUTPATIENT
Start: 2019-06-07 | End: 2019-06-07

## 2019-06-07 RX ORDER — HYDROMORPHONE HYDROCHLORIDE 1 MG/ML
0.4 INJECTION, SOLUTION INTRAMUSCULAR; INTRAVENOUS; SUBCUTANEOUS
Status: DISCONTINUED | OUTPATIENT
Start: 2019-06-07 | End: 2019-06-07 | Stop reason: HOSPADM

## 2019-06-07 RX ORDER — DIPHENHYDRAMINE HYDROCHLORIDE 50 MG/ML
12.5 INJECTION INTRAMUSCULAR; INTRAVENOUS
Status: DISCONTINUED | OUTPATIENT
Start: 2019-06-07 | End: 2019-06-07 | Stop reason: HOSPADM

## 2019-06-07 RX ORDER — ACETAMINOPHEN 500 MG
TABLET ORAL
Status: COMPLETED
Start: 2019-06-07 | End: 2019-06-07

## 2019-06-07 RX ORDER — HALOPERIDOL 5 MG/ML
1 INJECTION INTRAMUSCULAR
Status: DISCONTINUED | OUTPATIENT
Start: 2019-06-07 | End: 2019-06-07 | Stop reason: HOSPADM

## 2019-06-07 RX ADMIN — FENTANYL CITRATE 25 MCG: 50 INJECTION, SOLUTION INTRAMUSCULAR; INTRAVENOUS at 08:25

## 2019-06-07 RX ADMIN — TERAZOSIN HYDROCHLORIDE 5 MG: 5 CAPSULE ORAL at 20:32

## 2019-06-07 RX ADMIN — MORPHINE SULFATE 4 MG: 4 INJECTION INTRAVENOUS at 00:52

## 2019-06-07 RX ADMIN — ACETAMINOPHEN 1000 MG: 325 TABLET, FILM COATED ORAL at 07:04

## 2019-06-07 RX ADMIN — SERTRALINE HYDROCHLORIDE 200 MG: 100 TABLET ORAL at 20:32

## 2019-06-07 RX ADMIN — OXYCODONE HYDROCHLORIDE 5 MG: 5 TABLET ORAL at 14:14

## 2019-06-07 RX ADMIN — FENTANYL CITRATE 25 MCG: 50 INJECTION, SOLUTION INTRAMUSCULAR; INTRAVENOUS at 08:05

## 2019-06-07 RX ADMIN — MORPHINE SULFATE 4 MG: 4 INJECTION INTRAVENOUS at 04:31

## 2019-06-07 RX ADMIN — SODIUM CHLORIDE, POTASSIUM CHLORIDE, SODIUM LACTATE AND CALCIUM CHLORIDE: 600; 310; 30; 20 INJECTION, SOLUTION INTRAVENOUS at 07:32

## 2019-06-07 RX ADMIN — PROPOFOL 120 MG: 10 INJECTION, EMULSION INTRAVENOUS at 07:37

## 2019-06-07 RX ADMIN — SODIUM CHLORIDE, POTASSIUM CHLORIDE, SODIUM LACTATE AND CALCIUM CHLORIDE: 600; 310; 30; 20 INJECTION, SOLUTION INTRAVENOUS at 04:29

## 2019-06-07 RX ADMIN — OXYCODONE HYDROCHLORIDE 10 MG: 5 SOLUTION ORAL at 09:31

## 2019-06-07 RX ADMIN — MORPHINE SULFATE 4 MG: 4 INJECTION INTRAVENOUS at 02:37

## 2019-06-07 RX ADMIN — FENTANYL CITRATE 50 MCG: 50 INJECTION, SOLUTION INTRAMUSCULAR; INTRAVENOUS at 08:15

## 2019-06-07 RX ADMIN — EPHEDRINE SULFATE 20 MG: 50 INJECTION INTRAMUSCULAR; INTRAVENOUS; SUBCUTANEOUS at 07:45

## 2019-06-07 RX ADMIN — ONDANSETRON 4 MG: 2 INJECTION INTRAMUSCULAR; INTRAVENOUS at 07:55

## 2019-06-07 RX ADMIN — MIDAZOLAM HYDROCHLORIDE 2 MG: 1 INJECTION, SOLUTION INTRAMUSCULAR; INTRAVENOUS at 07:30

## 2019-06-07 RX ADMIN — CELECOXIB 200 MG: 100 CAPSULE ORAL at 07:08

## 2019-06-07 RX ADMIN — LIDOCAINE HYDROCHLORIDE 50 MG: 20 INJECTION, SOLUTION INTRAVENOUS at 07:37

## 2019-06-07 RX ADMIN — CEFAZOLIN 2 G: 330 INJECTION, POWDER, FOR SOLUTION INTRAMUSCULAR; INTRAVENOUS at 07:28

## 2019-06-07 RX ADMIN — CEFAZOLIN SODIUM 2 G: 2 INJECTION, SOLUTION INTRAVENOUS at 16:03

## 2019-06-07 RX ADMIN — SODIUM CHLORIDE, POTASSIUM CHLORIDE, SODIUM LACTATE AND CALCIUM CHLORIDE: 600; 310; 30; 20 INJECTION, SOLUTION INTRAVENOUS at 14:14

## 2019-06-07 RX ADMIN — SENNOSIDES,DOCUSATE SODIUM 2 TABLET: 8.6; 5 TABLET, FILM COATED ORAL at 16:06

## 2019-06-07 ASSESSMENT — COGNITIVE AND FUNCTIONAL STATUS - GENERAL
DRESSING REGULAR LOWER BODY CLOTHING: TOTAL
HELP NEEDED FOR BATHING: A LOT
SUGGESTED CMS G CODE MODIFIER DAILY ACTIVITY: CK
DAILY ACTIVITIY SCORE: 16
TOILETING: A LOT
DRESSING REGULAR UPPER BODY CLOTHING: A LITTLE

## 2019-06-07 ASSESSMENT — ENCOUNTER SYMPTOMS
FEVER: 0
MYALGIAS: 0
NAUSEA: 0
FOCAL WEAKNESS: 0
VOMITING: 0
WEAKNESS: 1
COUGH: 0
CHILLS: 0
ABDOMINAL PAIN: 0

## 2019-06-07 ASSESSMENT — PAIN SCALES - GENERAL: PAIN_LEVEL: 3

## 2019-06-07 ASSESSMENT — ACTIVITIES OF DAILY LIVING (ADL): TOILETING: INDEPENDENT

## 2019-06-07 NOTE — OR SURGEON
Immediate Post OP Note    PreOp Diagnosis: Right IT femur fracture with subtrochanteric extension    PostOp Diagnosis: same    Procedure(s):  INSERTION, INTRAMEDULLARY LOULOU, FEMUR, PROXIMAL - Wound Class: Clean    Surgeon(s):  Kendrick Sheldon M.D.    Anesthesiologist/Type of Anesthesia:  Anesthesiologist: Hemant Felix M.D./General    Surgical Staff:  Circulator: Isabella Melo R.N.  Scrub Person: Kae Parekh; Zari Hernandez  Count [place]: Dwight Spangler RSWATHI; Wendi Guardado R.N.  Radiology Technologist: Linsey Dowd    Specimens removed if any:  * No specimens in log *    Estimated Blood Loss: 200cc    Findings: see dictation    Complications: none known    PLAN:  --readmit medicine postop  --WBAT RLE  --ancef x 2 doses postop  --PT/OT for mobilization ASAP  --start lovenox tomorrow am, continue SCDs        6/7/2019 8:44 AM Kendrick Sheldon M.D.

## 2019-06-07 NOTE — CARE PLAN
Problem: Safety  Goal: Will remain free from falls  Outcome: PROGRESSING AS EXPECTED  Safety precautions initiated. Bed on lowest position. Treaded socks on. Appropriate assistive device in use. Side rails raised. Bed alarm in use. Hourly rounding initiated.     Problem: Pain Management  Goal: Pain level will decrease to patient's comfort goal  Outcome: PROGRESSING AS EXPECTED  administered pain medication per MAR order. Pt was made aware that pain medications was available. Initiated Q2 pain check on pt. Non-pharmalogical pain management offered to pt.

## 2019-06-07 NOTE — CARE PLAN
Problem: Pain Management  Goal: Pain level will decrease to patient's comfort goal  Denies pain at this time. PRN pain medications available. Ice to surgical site. Will continue to monitor.

## 2019-06-07 NOTE — ANESTHESIA TIME REPORT
Anesthesia Start and Stop Event Times     Date Time Event    6/7/2019 0732 Anesthesia Start     0849 Anesthesia Stop        Responsible Staff  06/07/19    Name Role Begin End    Hemant Felix M.D. Anesth 0742 0840        Preop Diagnosis (Free Text):  Pre-op Diagnosis     Right intertrochanteric femur fracture        Preop Diagnosis (Codes):  Diagnosis Information     Diagnosis Code(s):         Post op Diagnosis  Nondisplaced intertrochanteric fracture of right femur, initial encounter for closed fracture      Premium Reason  Non-Premium    Comments:

## 2019-06-07 NOTE — PROGRESS NOTES
74yoM with right IT femur fx.    S: NPO awaiting surgery, having pain in right groin, wife at bedside.    O:  Vitals:    06/07/19 0724   BP:    Pulse: 66   Resp: 16   Temp: 37.1 °C (98.7 °F)   SpO2: 91%     Exam:  General-NAD, alert and following commands  RLE-hip flexed, NVI distally    A: 74yoM with right IT femur fx.    Plan:  --NPO awaiting surgical fixation this am

## 2019-06-07 NOTE — ANESTHESIA QCDR
2019 [facility] Clinical Data Registry (for Quality Improvement)     Postoperative nausea/vomiting risk protocol (Adult = 18 yrs and Pediatric 3-17 yrs)- (430 and 463)  General inhalation anesthetic (NOT TIVA) with PONV risk factors: No  Provision of anti-emetic therapy with at least 2 different classes of agents: N/A  Patient DID NOT receive anti-emetic therapy and reason is documented in Medical Record: N/A    Multimodal Pain Management- (AQI59)  Patient undergoing Elective Surgery (i.e. Outpatient, or ASC, or Prescheduled Surgery prior to Hospital Admission): No  Use of Multimodal Pain Management, two or more drugs and/or interventions, NOT including systemic opioids: N/A  Exception: Documented allergy to multiple classes of analgesics: N/A    PACU assessment of acute postoperative pain prior to Anesthesia Care End- Applies to Patients Age = 18- (ABG7)  Initial PACU pain score is which of the following: < 7/10  Patient unable to report pain score: N/A    Post-anesthetic transfer of care checklist/protocol to PACU/ICU- (426 and 427)  Upon conclusion of case, patient transferred to which of the following locations: PACU/Non-ICU  Use of transfer checklist/protocol: Yes  Exclusion: Service Performed in Patient Hospital Room (and thus did not require transfer): N/A    PACU Reintubation- (AQI31)  General anesthesia requiring endotracheal intubation (ETT) along with subsequent extubation in OR or PACU: No  Required reintubation in the PACU: N/A  Extubation was a planned trial documented in the medical record prior to removal of the original airway device: N/A    Unplanned admission to ICU related to anesthesia service up through end of PACU care- (MD51)  Unplanned admission to ICU (not initially anticipated at anesthesia start time): No

## 2019-06-07 NOTE — OP REPORT
DATE OF SERVICE:  06/07/2019    PREOPERATIVE DIAGNOSIS:  Right intertrochanteric femur fracture with   subtrochanteric extension.    POSTOPERATIVE DIAGNOSIS:  Right intertrochanteric femur fracture with   subtrochanteric extension.    PROCEDURE PERFORMED:  Open treatment of right intertrochanteric femur fracture   with intramedullary nailing.    SURGEON:  Kendrick Sheldon MD    ANESTHESIOLOGIST:  Hemant Felix MD    ANESTHESIA:  General.    ESTIMATED BLOOD LOSS:  200 mL.    IMPLANTS:  Synthes 440 mm x 11 mm 125-degree TFNA with a 105 mm helical blade   and single 5.0 mm distal interlocking screw.    INDICATION FOR PROCEDURE:  The patient is a 74-year-old male.  He had a fall,   sustained a right minimally displaced intertrochanteric femur fracture.  There   was some suspicion for a subtrochanteric fracture extension.  He was admitted   to the hospitalist service and preparations were made and taken to the   operating room.  After discussing surgical risks, benefits, and alternatives   to surgery and he signed informed consent.  He wished to proceed as outlined   above.    DESCRIPTION OF PROCEDURE:  The patient was met in the preop holding area and   the surgical site was signed and his consent was confirmed for accuracy.  He   was taken to the operating room and general anesthesia was induced.  He was   positioned on the fracture table in supine position.  The right lower   extremity had some mild traction, internal rotation and adduction applied.  It   was confirmed that he did in fact have subtrochanteric femur fracture   extension on AP and lateral fluoroscopic imaging and therefore, I selected a   long intramedullary nail.  The right thigh was prepped and draped in usual   sterile fashion.  Formal time-out was performed confirming the patient's   correct name, correct surgical site, correct procedure and correct laterality.    A percutaneous starting point at the tip of the greater trochanter was   obtained  with a guidepin, which was advanced into the proximal femur.  It was   confirmed to be acceptably positioned.  An incision was made over the guidepin   through the skin and the entry reamer was used to enter the proximal femur at   the tip of the greater trochanter.  A bent and a ball-tipped guide aydin was   placed and the ball-tipped guide aydin was inserted through the entry portal   down to the distal femur was confirmed to be acceptable position on AP and   lateral fluoroscopic imaging.  I then sequentially reamed with an 8.5 mm end   cutting reamer up to a 12 mm reamer and selected an 11 mm x 440 mm 125-degree   TFNA and inserted it to the appropriate depth to achieve an appropriate tip to   apex distance with a guidepin through the aiming arm, which was also   confirmed to be acceptable fluoroscopically.  I then prepared for and inserted   105 mm helical blade and achieved a couple millimeters of compression through   the intertrochanteric femur fracture site.  I then placed 1 lateral to medial   interlocking screw distally using perfect [place] technique.  All insertion   instrumentation was removed.  Final fluoroscopic images confirmed overall to   the alignment of the fracture and acceptable position of the implants.  The   wounds were thoroughly irrigated with normal saline.  I reapproximated deep   subQ layers with 0 Vicryl subQ layers with 2-0 Vicryl and skin edges with   staples.  The wounds were thoroughly cleansed and dried and sterile dressings   were applied.  He was then taken out of traction, awoken from anesthesia and   transferred to the [facility] and taken to the postanesthesia care unit in stable   condition.    PLAN:  1.  The patient will be readmitted to the medical service.  2.  He can be weightbearing as tolerated on the right lower extremity.  3.  He should work with physical and occupational therapy as soon as possible   for mobilization.  4.  He will need Ancef for 2 doses postop for routine  infection prophylaxis.  5.  He can be started on Lovenox tomorrow and should have SCDs continued in   the meantime.       ____________________________________     MD TAYLOR Peck / JACINTA    DD:  06/07/2019 08:49:42  DT:  06/07/2019 09:05:01    D#:  [id number]  Job#:  [id number]

## 2019-06-07 NOTE — ANESTHESIA PREPROCEDURE EVALUATION
Relevant Problems   (+) Gastroesophageal reflux disease with esophagitis   (+) HTN (hypertension)   (+) Kidney stones   (+) Murmur, cardiac   (+) SOB (shortness of breath)       Physical Exam    Airway   Mallampati: II  TM distance: >3 FB  Neck ROM: full       Cardiovascular - normal exam  Rhythm: regular  Rate: normal  (-) murmur     Dental - normal exam         Pulmonary - normal exam  Breath sounds clear to auscultation     Abdominal    Neurological - normal exam                 Anesthesia Plan    ASA 2       Plan - general       Airway plan will be LMA        Induction: intravenous    Postoperative Plan: Postoperative administration of opioids is intended.    Pertinent diagnostic labs and testing reviewed    Informed Consent:    Anesthetic plan and risks discussed with patient.    Use of blood products discussed with: patient whom consented to blood products.

## 2019-06-07 NOTE — PROGRESS NOTES
[facility] Medicine Daily Progress Note    Date of Service  6/7/2019    Chief Complaint  74 y.o. male admitted 6/6/2019 with R IT femur fracture     Hospital Course    See below    Interval Problem Update  IT femur fracture-underwent surgery today, no pain currently, denies any new numbness or weakness    Consultants/Specialty  ortho    Code Status  fcfc    Disposition  ORTHO then SNF    Review of Systems  Review of Systems   Constitutional: Positive for malaise/fatigue. Negative for chills and fever.   Respiratory: Negative for cough.    Cardiovascular: Negative for chest pain.   Gastrointestinal: Negative for abdominal pain, nausea and vomiting.   Genitourinary: Negative for dysuria and urgency.   Musculoskeletal: Negative for joint pain and myalgias.   Neurological: Positive for weakness. Negative for focal weakness.   All other systems reviewed and are negative.       Physical Exam  Temp:  [36.8 °C (98.2 °F)-37.1 °C (98.7 °F)] 37 °C (98.6 °F)  Pulse:  [61-76] 72  Resp:  [12-20] 16  BP: (101-119)/(47-71) 101/49  SpO2:  [90 %-99 %] 94 %    Physical Exam   Constitutional: He is oriented to person, place, and time. He appears well-developed and well-nourished. No distress.   Slightly groggy   HENT:   Head: Normocephalic and atraumatic.   Mouth/Throat: No oropharyngeal exudate.   Eyes: Pupils are equal, round, and reactive to light. No scleral icterus.   Neck: Normal range of motion. Neck supple. No thyromegaly present.   Cardiovascular: Normal rate, regular rhythm, normal heart sounds and intact distal pulses.    No murmur heard.  Pulmonary/Chest: Effort normal and breath sounds normal. No respiratory distress. He has no wheezes.   Abdominal: Soft. Bowel sounds are normal. He exhibits no distension. There is no tenderness.   Musculoskeletal: Normal range of motion. He exhibits no edema or tenderness.   Surgical site appears CDI  Warm peripherally   Neurological: He is alert and oriented to person, place, and time. No  cranial nerve deficit.   Skin: Skin is warm and dry. No rash noted.   Psychiatric: He has a normal mood and affect.   Nursing note and vitals reviewed.      Fluids    Intake/Output Summary (Last 24 hours) at 06/07/19 1351  Last data filed at 06/07/19 0848   Gross per 24 hour   Intake              962 ml   Output             1000 ml   Net              -38 ml       Laboratory  Recent Labs      06/06/19   1245   WBC  7.9   RBC  3.94*   HEMOGLOBIN  12.4*   HEMATOCRIT  37.2*   MCV  94.4   MCH  31.5   MCHC  33.3*   RDW  47.5   PLATELETCT  119*   MPV  10.2     Recent Labs      06/06/19   1245   SODIUM  140   POTASSIUM  4.5   CHLORIDE  112   CO2  22   GLUCOSE  101*   BUN  19   CREATININE  1.22   CALCIUM  10.1     Recent Labs      06/06/19   1245   APTT  25.4   INR  1.25*               Imaging  DX-PORTABLE FLUOROSCOPY < 1 HOUR   Final Result         Portable fluoroscopy utilized for 1.9 minutes.      DX-FEMUR-2+ RIGHT   Final Result      Limited intraoperative images showing internal fixation of RIGHT proximal femur fracture.      DX-PELVIS-1 OR 2 VIEWS   Final Result      Nondisplaced intertrochanteric right femoral fracture.      Mild degenerative changes of the hips.      Degenerative changes in the visualized lower lumbar spine.         DX-FEMUR-2+ RIGHT   Final Result      Nondisplaced intertrochanteric right femoral fracture.         DX-CHEST-LIMITED (1 VIEW)   Final Result      Mild hypoinflation atelectasis.              Assessment/Plan  * Closed right hip fracture, initial encounter ([facility])- (present on admission)   Assessment & Plan    Secondary to a ground level fall  -s/p IM nailing, pain is well controlled  Dr. Sheldon, orthopedics, has been consulted.  -Multi modal pain therapy  -heparin for DVT px.      Benign prostatic hyperplasia without lower urinary tract symptoms- (present on admission)   Assessment & Plan    Continue outpatient Hytrin     RBBB (right bundle branch block with left anterior fascicular  block)- (present on admission)   Assessment & Plan    Noted on EKG  There is no previous EKG to refer to  Dr. Lopez, cardiology, has reviewed and recommends outpatient follow up as he has not been symptomatic--no dizziness, syncope, nor near-syncope  -no other changes noted at this time.     HTN (hypertension)- (present on admission)   Assessment & Plan    Continue lisinopril 40 mg daily with holding parameters, Cr levels are ok, monitor closely          VTE prophylaxis: heparin

## 2019-06-07 NOTE — THERAPY
"Occupational Therapy Evaluation completed.   Functional Status: Supine > EOB max A, LB dressing total A, seated grooming modified indep, sit > stand max A, unable to step or pivot, EOB > supine total A  Plan of Care: Will benefit from Occupational Therapy 4 times per week  Discharge Recommendations:  Equipment: Will Continue to Assess for Equipment Needs. Post-acute therapy: Discharge to a transitional care facility for continued skilled therapy services as of this assessment. Anticipate steady progress with possibility of progressing to able to DC home with HH. Will continue to assess and update recommendations.     See \"Rehab Therapy-Acute\" Patient Summary Report for complete documentation.    74 y.o. male with h/o HTN, s/p GLF with resultant R IT fx. Pt underwent IM nail, now WBAT. Pt seen for OT eval. Educated on WBAT, plan for functional progression. Pt mobilized to EOB with heavy assist. Once seated, able to maintain balance and complete seated oral care. Pt stood at FWW from elevated bed height. Attempted side-stepping, however unable to weight shift adequately. Pt heel-toed feet to move towards HOB using FWW. Returned to sitting, then supine with 2-person assist. Pt is limited by: pain, motor incoordination. Appears highly motivated with good family support. Acute OT to follow to train pt on compensatory ADL, progress safe transfers and standing activity, assist with DC planning and DME needs. Will follow.    "

## 2019-06-07 NOTE — ANESTHESIA POSTPROCEDURE EVALUATION
Patient: Celio Abreu    Procedure Summary     Date:  06/07/19 Room / Location:  [place] [address] / SURGERY [facility]    Anesthesia Start:  0732 Anesthesia Stop:  0849    Procedure:  INSERTION, INTRAMEDULLARY LOULOU, FEMUR, PROXIMAL (Right Hip) Diagnosis:  (Right intertrochanteric femur fracture)    Surgeon:  Kendrick Sheldon M.D. Responsible Provider:  Hemant Felix M.D.    Anesthesia Type:  general ASA Status:  2          Final Anesthesia Type: general  Last vitals  BP   Blood Pressure : 105/62, NIBP: 112/58    Temp   37.1 °C (98.7 °F)    Pulse   Pulse: 66, Heart Rate (Monitored): 63   Resp   16    SpO2   91 %      Anesthesia Post Evaluation    Patient location during evaluation: PACU  Patient participation: complete - patient participated  Level of consciousness: awake  Pain score: 3    Airway patency: patent  Anesthetic complications: no  Cardiovascular status: hemodynamically stable  Respiratory status: acceptable  Hydration status: euvolemic    PONV: none           Nurse Pain Score: 7 (NPRS)

## 2019-06-08 LAB
ANION GAP SERPL CALC-SCNC: 4 MMOL/L (ref 0–11.9)
BASOPHILS # BLD AUTO: 0.2 % (ref 0–1.8)
BASOPHILS # BLD: 0.01 K/UL (ref 0–0.12)
BUN SERPL-MCNC: 15 MG/DL (ref 8–22)
CALCIUM SERPL-MCNC: 9.1 MG/DL (ref 8.5–10.5)
CHLORIDE SERPL-SCNC: 106 MMOL/L (ref 96–112)
CO2 SERPL-SCNC: 25 MMOL/L (ref 20–33)
CREAT SERPL-MCNC: 0.75 MG/DL (ref 0.5–1.4)
EOSINOPHIL # BLD AUTO: 0.09 K/UL (ref 0–0.51)
EOSINOPHIL NFR BLD: 1.6 % (ref 0–6.9)
ERYTHROCYTE [DISTWIDTH] IN BLOOD BY AUTOMATED COUNT: 49.6 FL (ref 35.9–50)
GLUCOSE SERPL-MCNC: 124 MG/DL (ref 65–99)
HCT VFR BLD AUTO: 28.3 % (ref 42–52)
HGB BLD-MCNC: 9.2 G/DL (ref 14–18)
IMM GRANULOCYTES # BLD AUTO: 0.01 K/UL (ref 0–0.11)
IMM GRANULOCYTES NFR BLD AUTO: 0.2 % (ref 0–0.9)
LYMPHOCYTES # BLD AUTO: 0.87 K/UL (ref 1–4.8)
LYMPHOCYTES NFR BLD: 15.2 % (ref 22–41)
MCH RBC QN AUTO: 31.6 PG (ref 27–33)
MCHC RBC AUTO-ENTMCNC: 32.5 G/DL (ref 33.7–35.3)
MCV RBC AUTO: 97.3 FL (ref 81.4–97.8)
MONOCYTES # BLD AUTO: 0.5 K/UL (ref 0–0.85)
MONOCYTES NFR BLD AUTO: 8.8 % (ref 0–13.4)
NEUTROPHILS # BLD AUTO: 4.23 K/UL (ref 1.82–7.42)
NEUTROPHILS NFR BLD: 74 % (ref 44–72)
NRBC # BLD AUTO: 0 K/UL
NRBC BLD-RTO: 0 /100 WBC
PLATELET # BLD AUTO: 80 K/UL (ref 164–446)
PMV BLD AUTO: 10 FL (ref 9–12.9)
POTASSIUM SERPL-SCNC: 4.1 MMOL/L (ref 3.6–5.5)
RBC # BLD AUTO: 2.91 M/UL (ref 4.7–6.1)
SODIUM SERPL-SCNC: 135 MMOL/L (ref 135–145)
WBC # BLD AUTO: 5.7 K/UL (ref 4.8–10.8)

## 2019-06-08 PROCEDURE — 85025 COMPLETE CBC W/AUTO DIFF WBC: CPT

## 2019-06-08 PROCEDURE — 700111 HCHG RX REV CODE 636 W/ 250 OVERRIDE (IP): Performed by: ORTHOPAEDIC SURGERY

## 2019-06-08 PROCEDURE — 700105 HCHG RX REV CODE 258: Performed by: HOSPITALIST

## 2019-06-08 PROCEDURE — 700105 HCHG RX REV CODE 258: Performed by: INTERNAL MEDICINE

## 2019-06-08 PROCEDURE — 97162 PT EVAL MOD COMPLEX 30 MIN: CPT

## 2019-06-08 PROCEDURE — A9270 NON-COVERED ITEM OR SERVICE: HCPCS | Performed by: HOSPITALIST

## 2019-06-08 PROCEDURE — 770006 HCHG ROOM/CARE - MED/SURG/GYN SEMI*

## 2019-06-08 PROCEDURE — 99232 SBSQ HOSP IP/OBS MODERATE 35: CPT | Performed by: INTERNAL MEDICINE

## 2019-06-08 PROCEDURE — 80048 BASIC METABOLIC PNL TOTAL CA: CPT

## 2019-06-08 PROCEDURE — 700102 HCHG RX REV CODE 250 W/ 637 OVERRIDE(OP): Performed by: HOSPITALIST

## 2019-06-08 PROCEDURE — 700111 HCHG RX REV CODE 636 W/ 250 OVERRIDE (IP): Performed by: INTERNAL MEDICINE

## 2019-06-08 PROCEDURE — 36415 COLL VENOUS BLD VENIPUNCTURE: CPT

## 2019-06-08 RX ORDER — HEPARIN SODIUM 5000 [USP'U]/ML
5000 INJECTION, SOLUTION INTRAVENOUS; SUBCUTANEOUS EVERY 8 HOURS
Status: DISCONTINUED | OUTPATIENT
Start: 2019-06-08 | End: 2019-06-12 | Stop reason: HOSPADM

## 2019-06-08 RX ADMIN — SODIUM CHLORIDE, POTASSIUM CHLORIDE, SODIUM LACTATE AND CALCIUM CHLORIDE: 600; 310; 30; 20 INJECTION, SOLUTION INTRAVENOUS at 15:28

## 2019-06-08 RX ADMIN — SODIUM CHLORIDE, POTASSIUM CHLORIDE, SODIUM LACTATE AND CALCIUM CHLORIDE: 600; 310; 30; 20 INJECTION, SOLUTION INTRAVENOUS at 23:25

## 2019-06-08 RX ADMIN — CEFAZOLIN SODIUM 2 G: 2 INJECTION, SOLUTION INTRAVENOUS at 01:12

## 2019-06-08 RX ADMIN — TERAZOSIN HYDROCHLORIDE 5 MG: 5 CAPSULE ORAL at 21:56

## 2019-06-08 RX ADMIN — OXYCODONE HYDROCHLORIDE 10 MG: 5 TABLET ORAL at 21:56

## 2019-06-08 RX ADMIN — HEPARIN SODIUM 5000 UNITS: 5000 INJECTION, SOLUTION INTRAVENOUS; SUBCUTANEOUS at 21:57

## 2019-06-08 RX ADMIN — SODIUM CHLORIDE, POTASSIUM CHLORIDE, SODIUM LACTATE AND CALCIUM CHLORIDE: 600; 310; 30; 20 INJECTION, SOLUTION INTRAVENOUS at 01:12

## 2019-06-08 RX ADMIN — HEPARIN SODIUM 5000 UNITS: 5000 INJECTION, SOLUTION INTRAVENOUS; SUBCUTANEOUS at 10:15

## 2019-06-08 RX ADMIN — HEPARIN SODIUM 5000 UNITS: 5000 INJECTION, SOLUTION INTRAVENOUS; SUBCUTANEOUS at 15:28

## 2019-06-08 RX ADMIN — SERTRALINE HYDROCHLORIDE 200 MG: 100 TABLET ORAL at 21:57

## 2019-06-08 ASSESSMENT — ENCOUNTER SYMPTOMS
DEPRESSION: 0
ABDOMINAL PAIN: 0
SHORTNESS OF BREATH: 0
FOCAL WEAKNESS: 0
MYALGIAS: 0
PALPITATIONS: 0
WEAKNESS: 1

## 2019-06-08 ASSESSMENT — GAIT ASSESSMENTS
ASSISTIVE DEVICE: FRONT WHEEL WALKER
GAIT LEVEL OF ASSIST: UNABLE TO PARTICIPATE
DISTANCE (FEET): 0

## 2019-06-08 ASSESSMENT — COGNITIVE AND FUNCTIONAL STATUS - GENERAL
TURNING FROM BACK TO SIDE WHILE IN FLAT BAD: A LITTLE
SUGGESTED CMS G CODE MODIFIER MOBILITY: CM
MOVING FROM LYING ON BACK TO SITTING ON SIDE OF FLAT BED: UNABLE
STANDING UP FROM CHAIR USING ARMS: A LOT
MOBILITY SCORE: 9
WALKING IN HOSPITAL ROOM: TOTAL
MOVING TO AND FROM BED TO CHAIR: UNABLE
CLIMB 3 TO 5 STEPS WITH RAILING: TOTAL

## 2019-06-08 NOTE — THERAPY
"Physical Therapy Evaluation completed.   Bed Mobility:  Supine to Sit: Maximal Assist  Transfers: Sit to Stand: Maximal Assist  Gait: Level Of Assist: Unable to Participate with Front-Wheel Walker       Plan of Care: Will benefit from Physical Therapy 5 times per week  Discharge Recommendations: Equipment: Will Continue to Assess for Equipment Needs. Post-acute therapy Recommend inpatient transitional care services for continued physical therapy services.      Pt presents to acute PT s/p R hip I/M aydin/nail 2° to fx with subsequent pain, debility, weakness, impaired functional mobility/endurance and limited gait independence/tolerance. Pt is limited in activity tolerance and endurance including transfers and ambulation. Pt will benefit from skilled acute PT to address impairments and assist with d/c planning. Pt may be able to D/C home depending on progress in acute care, but as of now, Pt will likely require transitional care facility prior to d/c home due to weakness and lack of ambulation, endurance and [place] with functional activities.    See \"Rehab Therapy-Acute\" Patient Summary Report for complete documentation.     "

## 2019-06-08 NOTE — CARE PLAN
Problem: Safety  Goal: Will remain free from falls  Outcome: PROGRESSING AS EXPECTED  Safety precautions initiated. Bed on lowest position. Treaded socks on. Appropriate assistive device in use. Side rails raised. Bed alarm in use. Hourly rounding initiated.     Problem: Pain Management  Goal: Pain level will decrease to patient's comfort goal  Outcome: PROGRESSING AS EXPECTED  administered pain medication per MAR order. Pt was made aware that pain medications was available. Initiated Q2 pain check on pt. Non-pharmalogical pain management offered to pt.     Problem: Fluid Volume:  Goal: Will maintain balanced intake and output  Outcome: PROGRESSING AS EXPECTED  Encourage pt to increase oral fluid intake.

## 2019-06-08 NOTE — PROGRESS NOTES
"   Orthopaedic Progress Note    Interval changes:  Patient doing well post op  Cleared for DC home by ortho pending medicine and therapy clearance  Some issues with hypoxia with mobility off O2    ROS - Patient denies any new issues.  Pain well controlled.    BP (!) 95/55   Pulse 75   Temp 37.2 °C (98.9 °F) (Temporal)   Resp 17   Ht 1.676 m (5' 6\")   Wt 73.5 kg (162 lb 0.6 oz)   SpO2 93%       Patient seen and examined  No acute distress  Breathing non labored  RRR  Dressing clean dry and intact.  surgical incision is well approximated and is dry and clean.  There is no erythema, induration, or signs of infection.  Distally neurovascularly intact, cap refill < 2 sec     Recent Labs      06/06/19   1245  06/08/19   0420   WBC  7.9  5.7   RBC  3.94*  2.91*   HEMOGLOBIN  12.4*  9.2*   HEMATOCRIT  37.2*  28.3*   MCV  94.4  97.3   MCH  31.5  31.6   MCHC  33.3*  32.5*   RDW  47.5  49.6   PLATELETCT  119*  80*   MPV  10.2  10.0       Active Hospital Problems    Diagnosis   • RBBB (right bundle branch block with left anterior fascicular block) [I45.2]   • Closed right hip fracture, initial encounter ([facility]) [S72.001A]   • Benign prostatic hyperplasia without lower urinary tract symptoms [N40.0]   • HTN (hypertension) [I10]       Assessment/Plan:  Cleared for DC home by ortho pending medicine and therapy clearance  Patient will need DVT prophylaxis for 6 weeks at DC  POD#1 S/P Open treatment of right intertrochanteric femur fracture with intramedullary nailing.  Wt bearing status - WBAT RLE  Wound care/Drains - dressing left in place  Future Procedures - none planned  Sutures/Staples out- 10-14 days post operatively  PT/OT-initiated  Antibiotics: completed  DVT Prophylaxis- TEDS/SCDs/Foot pumps/heparin  Reza-none  Case Coordination for Discharge Planning - Disposition home    "

## 2019-06-08 NOTE — PROGRESS NOTES
[facility] Medicine Daily Progress Note    Date of Service  6/8/2019    Chief Complaint  74 y.o. male admitted 6/6/2019 with R IT femur fracture     Hospital Course    See below    Interval Problem Update  IT femur fracture-POD#1, doing well. Minimal pain, but was unable to work well with PT 2/2 stiffness. Denies any new numbness.     Consultants/Specialty  ortho    Code Status  fcfc    Disposition  ORTHO then SNF    Review of Systems  Review of Systems   Constitutional: Negative for malaise/fatigue.   Respiratory: Negative for shortness of breath.    Cardiovascular: Negative for palpitations.   Gastrointestinal: Negative for abdominal pain.   Genitourinary: Negative for urgency.   Musculoskeletal: Negative for joint pain and myalgias.   Neurological: Positive for weakness. Negative for focal weakness.        Stiffness of RLE   Psychiatric/Behavioral: Negative for depression.   All other systems reviewed and are negative.       Physical Exam  Temp:  [36.1 °C (97 °F)-37.8 °C (100 °F)] 37.2 °C (98.9 °F)  Pulse:  [66-85] 75  Resp:  [16-18] 17  BP: (82-97)/(42-66) 95/55  SpO2:  [90 %-93 %] 93 %    Physical Exam   Constitutional: He is oriented to person, place, and time. He appears well-developed and well-nourished. No distress.   More awake today   HENT:   Head: Normocephalic and atraumatic.   Mouth/Throat: No oropharyngeal exudate.   Eyes: Pupils are equal, round, and reactive to light. Right eye exhibits no discharge. Left eye exhibits no discharge.   Neck: Normal range of motion. Neck supple.   Cardiovascular: Normal rate, regular rhythm and intact distal pulses.    Pulmonary/Chest: Effort normal and breath sounds normal. No stridor. No respiratory distress.   Abdominal: Soft. Bowel sounds are normal. There is no tenderness.   Musculoskeletal: Normal range of motion. He exhibits no edema or tenderness.   Surgical site appears CDI-no changes noted today  Warm peripherally   Neurological: He is alert and oriented to  person, place, and time. No cranial nerve deficit.   Skin: Skin is warm and dry. No rash noted.   Psychiatric: He has a normal mood and affect.   Nursing note and vitals reviewed.      Fluids    Intake/Output Summary (Last 24 hours) at 06/08/19 1355  Last data filed at 06/08/19 1000   Gross per 24 hour   Intake              840 ml   Output              800 ml   Net               40 ml       Laboratory  Recent Labs      06/06/19   1245  06/08/19   0420   WBC  7.9  5.7   RBC  3.94*  2.91*   HEMOGLOBIN  12.4*  9.2*   HEMATOCRIT  37.2*  28.3*   MCV  94.4  97.3   MCH  31.5  31.6   MCHC  33.3*  32.5*   RDW  47.5  49.6   PLATELETCT  119*  80*   MPV  10.2  10.0     Recent Labs      06/06/19   1245  06/08/19   0420   SODIUM  140  135   POTASSIUM  4.5  4.1   CHLORIDE  112  106   CO2  22  25   GLUCOSE  101*  124*   BUN  19  15   CREATININE  1.22  0.75   CALCIUM  10.1  9.1     Recent Labs      06/06/19   1245   APTT  25.4   INR  1.25*               Imaging  DX-PORTABLE FLUOROSCOPY < 1 HOUR   Final Result         Portable fluoroscopy utilized for 1.9 minutes.      DX-FEMUR-2+ RIGHT   Final Result      Limited intraoperative images showing internal fixation of RIGHT proximal femur fracture.      DX-PELVIS-1 OR 2 VIEWS   Final Result      Nondisplaced intertrochanteric right femoral fracture.      Mild degenerative changes of the hips.      Degenerative changes in the visualized lower lumbar spine.         DX-FEMUR-2+ RIGHT   Final Result      Nondisplaced intertrochanteric right femoral fracture.         DX-CHEST-LIMITED (1 VIEW)   Final Result      Mild hypoinflation atelectasis.              Assessment/Plan  * Closed right hip fracture, initial encounter ([facility])- (present on admission)   Assessment & Plan    Secondary to a ground level fall  -s/p IM nailing POD#1, pain is well controlled, mostly complicated by stiffness  Dr. Sheldon, orthopedics, has been consulted.  -Multi modal pain therapy  -heparin for DVT px.      Benign  prostatic hyperplasia without lower urinary tract symptoms- (present on admission)   Assessment & Plan    Continue outpatient Hytrin, appears to be working well, but monitor blood pressure closely     RBBB (right bundle branch block with left anterior fascicular block)- (present on admission)   Assessment & Plan    Noted on EKG  There is no previous EKG to refer to  Dr. Lopez, cardiology, has reviewed and recommends outpatient follow up as he has not been symptomatic--no dizziness, syncope, nor near-syncope  -no other changes noted at this time.     HTN (hypertension)- (present on admission)   Assessment & Plan    -quite hypotensive this AM, asymptomatic  -hold lisinopril, monitor closely  -increase LR to 125 mL/hour, but monitor volume status closely          VTE prophylaxis: heparin

## 2019-06-09 LAB
ANION GAP SERPL CALC-SCNC: 6 MMOL/L (ref 0–11.9)
BASOPHILS # BLD AUTO: 0.2 % (ref 0–1.8)
BASOPHILS # BLD: 0.01 K/UL (ref 0–0.12)
BUN SERPL-MCNC: 11 MG/DL (ref 8–22)
CALCIUM SERPL-MCNC: 9.2 MG/DL (ref 8.5–10.5)
CHLORIDE SERPL-SCNC: 107 MMOL/L (ref 96–112)
CO2 SERPL-SCNC: 26 MMOL/L (ref 20–33)
CREAT SERPL-MCNC: 0.66 MG/DL (ref 0.5–1.4)
EOSINOPHIL # BLD AUTO: 0.14 K/UL (ref 0–0.51)
EOSINOPHIL NFR BLD: 3 % (ref 0–6.9)
ERYTHROCYTE [DISTWIDTH] IN BLOOD BY AUTOMATED COUNT: 46.1 FL (ref 35.9–50)
GLUCOSE SERPL-MCNC: 112 MG/DL (ref 65–99)
HCT VFR BLD AUTO: 27.1 % (ref 42–52)
HGB BLD-MCNC: 9.1 G/DL (ref 14–18)
IMM GRANULOCYTES # BLD AUTO: 0.02 K/UL (ref 0–0.11)
IMM GRANULOCYTES NFR BLD AUTO: 0.4 % (ref 0–0.9)
LYMPHOCYTES # BLD AUTO: 0.77 K/UL (ref 1–4.8)
LYMPHOCYTES NFR BLD: 16.2 % (ref 22–41)
MCH RBC QN AUTO: 31.8 PG (ref 27–33)
MCHC RBC AUTO-ENTMCNC: 33.6 G/DL (ref 33.7–35.3)
MCV RBC AUTO: 94.8 FL (ref 81.4–97.8)
MONOCYTES # BLD AUTO: 0.44 K/UL (ref 0–0.85)
MONOCYTES NFR BLD AUTO: 9.3 % (ref 0–13.4)
NEUTROPHILS # BLD AUTO: 3.36 K/UL (ref 1.82–7.42)
NEUTROPHILS NFR BLD: 70.9 % (ref 44–72)
NRBC # BLD AUTO: 0 K/UL
NRBC BLD-RTO: 0 /100 WBC
PLATELET # BLD AUTO: 78 K/UL (ref 164–446)
PMV BLD AUTO: 10.1 FL (ref 9–12.9)
POTASSIUM SERPL-SCNC: 4.1 MMOL/L (ref 3.6–5.5)
RBC # BLD AUTO: 2.86 M/UL (ref 4.7–6.1)
SODIUM SERPL-SCNC: 139 MMOL/L (ref 135–145)
WBC # BLD AUTO: 4.7 K/UL (ref 4.8–10.8)

## 2019-06-09 PROCEDURE — A9270 NON-COVERED ITEM OR SERVICE: HCPCS | Performed by: INTERNAL MEDICINE

## 2019-06-09 PROCEDURE — 700111 HCHG RX REV CODE 636 W/ 250 OVERRIDE (IP): Performed by: INTERNAL MEDICINE

## 2019-06-09 PROCEDURE — 770006 HCHG ROOM/CARE - MED/SURG/GYN SEMI*

## 2019-06-09 PROCEDURE — 700102 HCHG RX REV CODE 250 W/ 637 OVERRIDE(OP): Performed by: INTERNAL MEDICINE

## 2019-06-09 PROCEDURE — 80048 BASIC METABOLIC PNL TOTAL CA: CPT

## 2019-06-09 PROCEDURE — 99232 SBSQ HOSP IP/OBS MODERATE 35: CPT | Performed by: INTERNAL MEDICINE

## 2019-06-09 PROCEDURE — 700105 HCHG RX REV CODE 258: Performed by: INTERNAL MEDICINE

## 2019-06-09 PROCEDURE — 85025 COMPLETE CBC W/AUTO DIFF WBC: CPT

## 2019-06-09 PROCEDURE — A9270 NON-COVERED ITEM OR SERVICE: HCPCS | Performed by: HOSPITALIST

## 2019-06-09 PROCEDURE — 36415 COLL VENOUS BLD VENIPUNCTURE: CPT

## 2019-06-09 PROCEDURE — 700102 HCHG RX REV CODE 250 W/ 637 OVERRIDE(OP): Performed by: HOSPITALIST

## 2019-06-09 RX ORDER — ACETAMINOPHEN 500 MG
1000 TABLET ORAL EVERY 6 HOURS
Status: DISCONTINUED | OUTPATIENT
Start: 2019-06-09 | End: 2019-06-12 | Stop reason: HOSPADM

## 2019-06-09 RX ADMIN — SENNOSIDES,DOCUSATE SODIUM 2 TABLET: 8.6; 5 TABLET, FILM COATED ORAL at 04:03

## 2019-06-09 RX ADMIN — TERAZOSIN HYDROCHLORIDE 5 MG: 5 CAPSULE ORAL at 21:35

## 2019-06-09 RX ADMIN — HEPARIN SODIUM 5000 UNITS: 5000 INJECTION, SOLUTION INTRAVENOUS; SUBCUTANEOUS at 15:28

## 2019-06-09 RX ADMIN — HEPARIN SODIUM 5000 UNITS: 5000 INJECTION, SOLUTION INTRAVENOUS; SUBCUTANEOUS at 21:34

## 2019-06-09 RX ADMIN — SODIUM CHLORIDE, POTASSIUM CHLORIDE, SODIUM LACTATE AND CALCIUM CHLORIDE: 600; 310; 30; 20 INJECTION, SOLUTION INTRAVENOUS at 11:16

## 2019-06-09 RX ADMIN — SODIUM CHLORIDE, POTASSIUM CHLORIDE, SODIUM LACTATE AND CALCIUM CHLORIDE: 600; 310; 30; 20 INJECTION, SOLUTION INTRAVENOUS at 21:34

## 2019-06-09 RX ADMIN — ACETAMINOPHEN 1000 MG: 500 TABLET ORAL at 11:16

## 2019-06-09 RX ADMIN — ACETAMINOPHEN 1000 MG: 500 TABLET ORAL at 09:49

## 2019-06-09 RX ADMIN — HEPARIN SODIUM 5000 UNITS: 5000 INJECTION, SOLUTION INTRAVENOUS; SUBCUTANEOUS at 04:02

## 2019-06-09 RX ADMIN — ACETAMINOPHEN 1000 MG: 500 TABLET ORAL at 17:23

## 2019-06-09 RX ADMIN — SERTRALINE HYDROCHLORIDE 200 MG: 100 TABLET ORAL at 21:34

## 2019-06-09 ASSESSMENT — ENCOUNTER SYMPTOMS
DEPRESSION: 0
DIZZINESS: 0
NECK PAIN: 0
CHILLS: 0
COUGH: 0
FEVER: 0
VOMITING: 0
WEAKNESS: 1
NAUSEA: 0

## 2019-06-09 NOTE — PROGRESS NOTES
Hospital Medicine Daily Progress Note    Date of Service  6/9/2019    Chief Complaint  74 y.o. male admitted 6/6/2019 with R IT femur fracture     Hospital Course    See below    Interval Problem Update  IT femur fracture-POD#2, patient is progressing slowly and still has some weakness and limited range of motion but pain is overall well controlled.  Denies any new numbness.    Consultants/Specialty  ortho    Code Status  fcfc    Disposition  ORTHO then SNF    Review of Systems  Review of Systems   Constitutional: Negative for chills and fever.        No obvious clinical improvement   Respiratory: Negative for cough.    Cardiovascular: Negative for chest pain.   Gastrointestinal: Negative for nausea and vomiting.   Genitourinary: Negative for dysuria.   Musculoskeletal: Negative for neck pain.   Neurological: Positive for weakness. Negative for dizziness.        Stiffness of RLE   Psychiatric/Behavioral: Negative for depression.   All other systems reviewed and are negative.       Physical Exam  Temp:  [36.6 °C (97.9 °F)-37.7 °C (99.9 °F)] 36.6 °C (97.9 °F)  Pulse:  [68-75] 72  Resp:  [16-24] 16  BP: (105-123)/(53-66) 114/53  SpO2:  [90 %-98 %] 98 %    Physical Exam   Constitutional: He is oriented to person, place, and time. He appears well-developed and well-nourished.   Interactive   HENT:   Head: Normocephalic and atraumatic.   Eyes: Pupils are equal, round, and reactive to light. No scleral icterus.   Neck: Normal range of motion. Neck supple.   Cardiovascular: Normal rate, regular rhythm and intact distal pulses.    Pulmonary/Chest: Effort normal and breath sounds normal. No stridor. No respiratory distress.   Abdominal: Soft. Bowel sounds are normal. He exhibits no distension.   Musculoskeletal: Normal range of motion. He exhibits no edema or tenderness.   Surgical site appears CDI-no clear clinical interval change  Warm peripherally  2+ DP B/L   Neurological: He is alert and oriented to person, place, and  time. No cranial nerve deficit.   Skin: Skin is warm and dry. No rash noted.   Psychiatric: He has a normal mood and affect.   Nursing note and vitals reviewed.      Fluids    Intake/Output Summary (Last 24 hours) at 06/09/19 1418  Last data filed at 06/09/19 0440   Gross per 24 hour   Intake             1235 ml   Output              840 ml   Net              395 ml       Laboratory  Recent Labs      06/08/19   0420  06/09/19   0359   WBC  5.7  4.7*   RBC  2.91*  2.86*   HEMOGLOBIN  9.2*  9.1*   HEMATOCRIT  28.3*  27.1*   MCV  97.3  94.8   MCH  31.6  31.8   MCHC  32.5*  33.6*   RDW  49.6  46.1   PLATELETCT  80*  78*   MPV  10.0  10.1     Recent Labs      06/08/19   0420 06/09/19   0359   SODIUM  135  139   POTASSIUM  4.1  4.1   CHLORIDE  106  107   CO2  25  26   GLUCOSE  124*  112*   BUN  15  11   CREATININE  0.75  0.66   CALCIUM  9.1  9.2                   Imaging  DX-PORTABLE FLUOROSCOPY < 1 HOUR   Final Result         Portable fluoroscopy utilized for 1.9 minutes.      DX-FEMUR-2+ RIGHT   Final Result      Limited intraoperative images showing internal fixation of RIGHT proximal femur fracture.      DX-PELVIS-1 OR 2 VIEWS   Final Result      Nondisplaced intertrochanteric right femoral fracture.      Mild degenerative changes of the hips.      Degenerative changes in the visualized lower lumbar spine.         DX-FEMUR-2+ RIGHT   Final Result      Nondisplaced intertrochanteric right femoral fracture.         DX-CHEST-LIMITED (1 VIEW)   Final Result      Mild hypoinflation atelectasis.              Assessment/Plan  * Closed right hip fracture, initial encounter ([facility])- (present on admission)   Assessment & Plan    Secondary to a ground level fall  -s/p IM nailing POD#2, pain is well controlled, mostly complicated by stiffness (no clear change)  -SNF order placed today  Dr. Sheldon, orthopedics, has been consulted.  -Multi modal pain therapy, no adjustments needed today  -heparin for DVT px.      Benign prostatic  hyperplasia without lower urinary tract symptoms- (present on admission)   Assessment & Plan    Continue outpatient Hytrin, appears to be working well, but monitor blood pressure closely     RBBB (right bundle branch block with left anterior fascicular block)- (present on admission)   Assessment & Plan    Noted on EKG  There is no previous EKG to refer to  Dr. Lopez, cardiology, has reviewed and recommends outpatient follow up as he has not been symptomatic--no dizziness, syncope, nor near-syncope  -no other changes noted at this time.     HTN (hypertension)- (present on admission)   Assessment & Plan    -bp remains soft, but is improving some  -hold lisinopril, monitor closely  -decrease LR rate to 75 ml/hour          VTE prophylaxis: heparin

## 2019-06-09 NOTE — PROGRESS NOTES
Pt did well overnight. VSS. A&OX3. Does not tolerate movement very well in bed or at bedside. 2 person Max assist. Pain treated with PRN meds. Pt denies other needs at this moment.

## 2019-06-10 PROBLEM — J96.01 ACUTE RESPIRATORY FAILURE WITH HYPOXIA (HCC): Status: ACTIVE | Noted: 2019-06-10

## 2019-06-10 LAB
BASOPHILS # BLD AUTO: 0.4 % (ref 0–1.8)
BASOPHILS # BLD: 0.02 K/UL (ref 0–0.12)
EOSINOPHIL # BLD AUTO: 0.2 K/UL (ref 0–0.51)
EOSINOPHIL NFR BLD: 4.2 % (ref 0–6.9)
ERYTHROCYTE [DISTWIDTH] IN BLOOD BY AUTOMATED COUNT: 46.8 FL (ref 35.9–50)
HCT VFR BLD AUTO: 26.8 % (ref 42–52)
HGB BLD-MCNC: 8.7 G/DL (ref 14–18)
IMM GRANULOCYTES # BLD AUTO: 0.02 K/UL (ref 0–0.11)
IMM GRANULOCYTES NFR BLD AUTO: 0.4 % (ref 0–0.9)
LYMPHOCYTES # BLD AUTO: 1.07 K/UL (ref 1–4.8)
LYMPHOCYTES NFR BLD: 22.4 % (ref 22–41)
MCH RBC QN AUTO: 31.2 PG (ref 27–33)
MCHC RBC AUTO-ENTMCNC: 32.5 G/DL (ref 33.7–35.3)
MCV RBC AUTO: 96.1 FL (ref 81.4–97.8)
MONOCYTES # BLD AUTO: 0.31 K/UL (ref 0–0.85)
MONOCYTES NFR BLD AUTO: 6.5 % (ref 0–13.4)
NEUTROPHILS # BLD AUTO: 3.16 K/UL (ref 1.82–7.42)
NEUTROPHILS NFR BLD: 66.1 % (ref 44–72)
NRBC # BLD AUTO: 0 K/UL
NRBC BLD-RTO: 0 /100 WBC
PLATELET # BLD AUTO: 86 K/UL (ref 164–446)
PMV BLD AUTO: 10.4 FL (ref 9–12.9)
RBC # BLD AUTO: 2.79 M/UL (ref 4.7–6.1)
WBC # BLD AUTO: 4.8 K/UL (ref 4.8–10.8)

## 2019-06-10 PROCEDURE — A9270 NON-COVERED ITEM OR SERVICE: HCPCS | Performed by: HOSPITALIST

## 2019-06-10 PROCEDURE — 700102 HCHG RX REV CODE 250 W/ 637 OVERRIDE(OP): Performed by: HOSPITALIST

## 2019-06-10 PROCEDURE — 36415 COLL VENOUS BLD VENIPUNCTURE: CPT

## 2019-06-10 PROCEDURE — 99233 SBSQ HOSP IP/OBS HIGH 50: CPT | Performed by: INTERNAL MEDICINE

## 2019-06-10 PROCEDURE — 700102 HCHG RX REV CODE 250 W/ 637 OVERRIDE(OP): Performed by: INTERNAL MEDICINE

## 2019-06-10 PROCEDURE — A9270 NON-COVERED ITEM OR SERVICE: HCPCS | Performed by: INTERNAL MEDICINE

## 2019-06-10 PROCEDURE — 85025 COMPLETE CBC W/AUTO DIFF WBC: CPT

## 2019-06-10 PROCEDURE — 700111 HCHG RX REV CODE 636 W/ 250 OVERRIDE (IP): Performed by: INTERNAL MEDICINE

## 2019-06-10 PROCEDURE — 770006 HCHG ROOM/CARE - MED/SURG/GYN SEMI*

## 2019-06-10 RX ADMIN — SERTRALINE HYDROCHLORIDE 200 MG: 100 TABLET ORAL at 20:33

## 2019-06-10 RX ADMIN — HEPARIN SODIUM 5000 UNITS: 5000 INJECTION, SOLUTION INTRAVENOUS; SUBCUTANEOUS at 04:30

## 2019-06-10 RX ADMIN — POLYETHYLENE GLYCOL 3350 1 PACKET: 17 POWDER, FOR SOLUTION ORAL at 04:19

## 2019-06-10 RX ADMIN — ACETAMINOPHEN 1000 MG: 500 TABLET ORAL at 14:04

## 2019-06-10 RX ADMIN — ACETAMINOPHEN 1000 MG: 500 TABLET ORAL at 00:07

## 2019-06-10 RX ADMIN — HEPARIN SODIUM 5000 UNITS: 5000 INJECTION, SOLUTION INTRAVENOUS; SUBCUTANEOUS at 14:04

## 2019-06-10 RX ADMIN — TERAZOSIN HYDROCHLORIDE 5 MG: 5 CAPSULE ORAL at 20:33

## 2019-06-10 RX ADMIN — HEPARIN SODIUM 5000 UNITS: 5000 INJECTION, SOLUTION INTRAVENOUS; SUBCUTANEOUS at 20:33

## 2019-06-10 RX ADMIN — ACETAMINOPHEN 1000 MG: 500 TABLET ORAL at 18:15

## 2019-06-10 RX ADMIN — OXYCODONE HYDROCHLORIDE 5 MG: 5 TABLET ORAL at 04:29

## 2019-06-10 RX ADMIN — SENNOSIDES,DOCUSATE SODIUM 2 TABLET: 8.6; 5 TABLET, FILM COATED ORAL at 04:30

## 2019-06-10 ASSESSMENT — ENCOUNTER SYMPTOMS
PALPITATIONS: 0
SHORTNESS OF BREATH: 0
FEVER: 0
NECK PAIN: 0
FOCAL WEAKNESS: 1
ABDOMINAL PAIN: 0
DIZZINESS: 0
DEPRESSION: 0
WEAKNESS: 1

## 2019-06-10 NOTE — PROGRESS NOTES
Patient up to bedside commode x2 today. Patient desats on 5L NC to 75% when supine to edge of bed, edge of bed to standing and pivoting to commode; at rest patient saturating at 98%, No complaints of pain today.

## 2019-06-10 NOTE — PROGRESS NOTES
"   Orthopaedic Progress Note    Interval changes:  Patient doing well   Cleared for DC home vs SNF by ortho pending medicine and therapy clearance    ROS - Patient denies any new issues.  Pain well controlled.    /67   Pulse 60   Temp 36.4 °C (97.6 °F) (Temporal)   Resp 16   Ht 1.676 m (5' 6\")   Wt 73.5 kg (162 lb 0.6 oz)   SpO2 97%       Patient seen and examined  No acute distress  Breathing non labored  RRR  RLE surgical dressings are clean, dry, and intact. Patient clearly fires tibialis anterior, EHL, and gastrocnemius/soleus. Sensation is intact to light touch throughout superficial peroneal, deep peroneal, tibial, saphenous, and sural nerve distributions. Strong and palpable 2+ dorsalis pedis and posterior tibial pulses with capillary refill less than 2 seconds. No lower leg tenderness or discomfort.        Recent Labs      06/08/19   0420  06/09/19   0359  06/10/19   0300   WBC  5.7  4.7*  4.8   RBC  2.91*  2.86*  2.79*   HEMOGLOBIN  9.2*  9.1*  8.7*   HEMATOCRIT  28.3*  27.1*  26.8*   MCV  97.3  94.8  96.1   MCH  31.6  31.8  31.2   MCHC  32.5*  33.6*  32.5*   RDW  49.6  46.1  46.8   PLATELETCT  80*  78*  86*   MPV  10.0  10.1  10.4       Active Hospital Problems    Diagnosis   • Acute respiratory failure with hypoxia ([facility]) [J96.01]   • RBBB (right bundle branch block with left anterior fascicular block) [I45.2]   • Closed right hip fracture, initial encounter ([facility]) [S72.001A]   • Benign prostatic hyperplasia without lower urinary tract symptoms [N40.0]   • HTN (hypertension) [I10]       Assessment/Plan:  Cleared for DC home by ortho pending medicine and therapy clearance  Patient will need DVT prophylaxis for 6 weeks at DC  POD#3 S/P Open treatment of right intertrochanteric femur fracture with intramedullary nailing.  Wt bearing status - WBAT RLE  Wound care/Drains - dressing left in place  Future Procedures - none planned  Sutures/Staples out- 10-14 days post " operatively  PT/OT-initiated  Antibiotics: completed  DVT Prophylaxis- TEDS/SCDs/Foot pumps/heparin  Reza-none  Case Coordination for Discharge Planning - Disposition home

## 2019-06-10 NOTE — PROGRESS NOTES
[facility] Medicine Daily Progress Note    Date of Service  6/10/2019    Chief Complaint  74 y.o. male admitted 6/6/2019 with R IT femur fracture     Hospital Course    See below    Interval Problem Update  IT femur fracture-POD#4, still having a hard time walking    Consultants/Specialty  ortho    Code Status  fcfc    Disposition  ORTHO then SNF    Review of Systems  Review of Systems   Constitutional: Negative for fever.        No obvious clinical improvement   Respiratory: Negative for shortness of breath.    Cardiovascular: Negative for palpitations.   Gastrointestinal: Negative for abdominal pain.   Genitourinary: Negative for dysuria.   Musculoskeletal: Negative for neck pain.   Neurological: Positive for focal weakness (RLE) and weakness (unchanged today). Negative for dizziness.        Stiffness of RLE   Psychiatric/Behavioral: Negative for depression.   All other systems reviewed and are negative.       Physical Exam  Temp:  [36.4 °C (97.6 °F)-36.8 °C (98.3 °F)] 36.4 °C (97.6 °F)  Pulse:  [60-77] 60  Resp:  [16-17] 16  BP: (112-132)/(63-79) 122/67  SpO2:  [97 %-100 %] 97 %    Physical Exam   Constitutional: He is oriented to person, place, and time. He appears well-developed and well-nourished.   Interactive   HENT:   Head: Normocephalic and atraumatic.   Eyes: Pupils are equal, round, and reactive to light. Right eye exhibits no discharge. Left eye exhibits no discharge.   Neck: Normal range of motion. Neck supple.   Cardiovascular: Normal rate, regular rhythm and intact distal pulses.    Pulmonary/Chest: Effort normal and breath sounds normal. He has no wheezes. He has no rales.   Abdominal: Soft. Bowel sounds are normal. There is no tenderness.   Musculoskeletal: Normal range of motion. He exhibits no tenderness.   Surgical site appears CDI-unchanged today  Warm peripherally  2+ DP B/L   Neurological: He is alert and oriented to person, place, and time. No cranial nerve deficit.   Skin: Skin is warm and  dry. No erythema.   Psychiatric: He has a normal mood and affect.   Nursing note and vitals reviewed.      Fluids    Intake/Output Summary (Last 24 hours) at 06/10/19 1318  Last data filed at 06/10/19 0030   Gross per 24 hour   Intake              240 ml   Output              500 ml   Net             -260 ml       Laboratory  Recent Labs      06/08/19   0420  06/09/19   0359  06/10/19   0300   WBC  5.7  4.7*  4.8   RBC  2.91*  2.86*  2.79*   HEMOGLOBIN  9.2*  9.1*  8.7*   HEMATOCRIT  28.3*  27.1*  26.8*   MCV  97.3  94.8  96.1   MCH  31.6  31.8  31.2   MCHC  32.5*  33.6*  32.5*   RDW  49.6  46.1  46.8   PLATELETCT  80*  78*  86*   MPV  10.0  10.1  10.4     Recent Labs      06/08/19   0420 06/09/19   0359   SODIUM  135  139   POTASSIUM  4.1  4.1   CHLORIDE  106  107   CO2  25  26   GLUCOSE  124*  112*   BUN  15  11   CREATININE  0.75  0.66   CALCIUM  9.1  9.2                   Imaging  DX-PORTABLE FLUOROSCOPY < 1 HOUR   Final Result         Portable fluoroscopy utilized for 1.9 minutes.      DX-FEMUR-2+ RIGHT   Final Result      Limited intraoperative images showing internal fixation of RIGHT proximal femur fracture.      DX-PELVIS-1 OR 2 VIEWS   Final Result      Nondisplaced intertrochanteric right femoral fracture.      Mild degenerative changes of the hips.      Degenerative changes in the visualized lower lumbar spine.         DX-FEMUR-2+ RIGHT   Final Result      Nondisplaced intertrochanteric right femoral fracture.         DX-CHEST-LIMITED (1 VIEW)   Final Result      Mild hypoinflation atelectasis.              Assessment/Plan  * Closed right hip fracture, initial encounter ([facility])- (present on admission)   Assessment & Plan    Secondary to a ground level fall  -s/p IM nailing POD#3, pain is well controlled, mostly complicated by stiffness (no clear change), no further changes  -SNF order placed today  Dr. Sheldon, orthopedics, has been consulted.  -Multi modal pain therapy, no adjustments needed  today  -heparin for DVT px.      Acute respiratory failure with hypoxia ([facility])- (present on admission)   Assessment & Plan    -slightly worse today, mild VOL  -stop IVF's  -check CXR     Benign prostatic hyperplasia without lower urinary tract symptoms- (present on admission)   Assessment & Plan    Continue outpatient Hytrin, appears to be working well, but monitor blood pressure closely     RBBB (right bundle branch block with left anterior fascicular block)- (present on admission)   Assessment & Plan    Noted on EKG  There is no previous EKG to refer to  Dr. Lopez, cardiology, has reviewed and recommends outpatient follow up as he has not been symptomatic--no dizziness, syncope, nor near-syncope  -no other changes noted at this time.     HTN (hypertension)- (present on admission)   Assessment & Plan    -BP is ok  -hold lisinopril, monitor closely  -stop IVF's          VTE prophylaxis: heparin

## 2019-06-10 NOTE — CARE PLAN
Problem: Safety  Goal: Will remain free from falls    Intervention: Assess risk factors for falls  Patient on a High Risk for fall; Bed alarm utilized. Fall precautions in place. Bedside table and call button within patient's reach.       Problem: Venous Thromboembolism (VTW)/Deep Vein Thrombosis (DVT) Prevention:  Goal: Patient will participate in Venous Thrombosis (VTE)/Deep Vein Thrombosis (DVT)Prevention Measures    Intervention: Assess and monitor for anticoagulation complications  Continues on anticoagulant therapy; No visible s/s of active bleeding noted  Intervention: Ensure patient wears graduated elastic stockings (SAM hose) and/or SCDs, if ordered, when in bed or chair (Remove at least once per shift for skin check)  SCDs in use to BLE

## 2019-06-10 NOTE — ASSESSMENT & PLAN NOTE
Improving today after intravenous fluids were stopped, this was likely secondary to volume overload  Encourage incentive spirometer

## 2019-06-10 NOTE — DISCHARGE PLANNING
Care Transition Team Assessment    Information Source  Orientation : Disoriented to Time  Information Given By: Patient  Informant's Name: Juana Abreu [phone]  Who is responsible for making decisions for patient? : Patient    Readmission Evaluation  Is this a readmission?: No    Elopement Risk  Legal Hold: No  Ambulatory or Self Mobile in Wheelchair: No-Not an Elopement Risk  Elopement Risk: Not at Risk for Elopement    Interdisciplinary Discharge Planning  Lives with - Patient's Self Care Capacity: Spouse  Patient or legal guardian wants to designate a caregiver (see row info): No  Housing / Facility: [address] [facility]  Prior Services: None, Home-Independent    Discharge Preparedness  What is your plan after discharge?: Uncertain - pending medical team collaboration, Skilled nursing facility  What are your discharge supports?: Spouse  Prior Functional Level: Uses Walker    Functional Assesment  Prior Functional Level: Uses Walker    Finances  Prescription Coverage: Yes    Vision / Hearing Impairment  Vision Impairment : Yes  Right Eye Vision: Wears Glasses  Left Eye Vision: Wears Glasses  Hearing Impairment : Yes  Hearing Impairment: Both Ears  Does Pt Need Special Equipment for the Hearing Impaired?: No              Domestic Abuse  Have you ever been the victim of abuse or violence?: No  Physical Abuse or Sexual Abuse: No  Verbal Abuse or Emotional Abuse: No    Psychological Assessment  History of Substance Abuse: None  History of Psychiatric Problems: No  Non-compliant with Treatment: No  Newly Diagnosed Illness: No         Anticipated Discharge Information  Anticipated discharge disposition: Home, SNF

## 2019-06-11 LAB
ANION GAP SERPL CALC-SCNC: 6 MMOL/L (ref 0–11.9)
BUN SERPL-MCNC: 11 MG/DL (ref 8–22)
CALCIUM SERPL-MCNC: 9.6 MG/DL (ref 8.5–10.5)
CHLORIDE SERPL-SCNC: 110 MMOL/L (ref 96–112)
CO2 SERPL-SCNC: 27 MMOL/L (ref 20–33)
CREAT SERPL-MCNC: 0.7 MG/DL (ref 0.5–1.4)
GLUCOSE SERPL-MCNC: 104 MG/DL (ref 65–99)
POTASSIUM SERPL-SCNC: 4 MMOL/L (ref 3.6–5.5)
SODIUM SERPL-SCNC: 143 MMOL/L (ref 135–145)

## 2019-06-11 PROCEDURE — 97535 SELF CARE MNGMENT TRAINING: CPT

## 2019-06-11 PROCEDURE — A9270 NON-COVERED ITEM OR SERVICE: HCPCS | Performed by: PHYSICIAN ASSISTANT

## 2019-06-11 PROCEDURE — 36415 COLL VENOUS BLD VENIPUNCTURE: CPT

## 2019-06-11 PROCEDURE — 97530 THERAPEUTIC ACTIVITIES: CPT

## 2019-06-11 PROCEDURE — A9270 NON-COVERED ITEM OR SERVICE: HCPCS | Performed by: INTERNAL MEDICINE

## 2019-06-11 PROCEDURE — 700111 HCHG RX REV CODE 636 W/ 250 OVERRIDE (IP): Performed by: INTERNAL MEDICINE

## 2019-06-11 PROCEDURE — 99232 SBSQ HOSP IP/OBS MODERATE 35: CPT | Performed by: HOSPITALIST

## 2019-06-11 PROCEDURE — 700102 HCHG RX REV CODE 250 W/ 637 OVERRIDE(OP): Performed by: PHYSICIAN ASSISTANT

## 2019-06-11 PROCEDURE — 700102 HCHG RX REV CODE 250 W/ 637 OVERRIDE(OP): Performed by: INTERNAL MEDICINE

## 2019-06-11 PROCEDURE — 700102 HCHG RX REV CODE 250 W/ 637 OVERRIDE(OP): Performed by: HOSPITALIST

## 2019-06-11 PROCEDURE — 770006 HCHG ROOM/CARE - MED/SURG/GYN SEMI*

## 2019-06-11 PROCEDURE — A9270 NON-COVERED ITEM OR SERVICE: HCPCS | Performed by: HOSPITALIST

## 2019-06-11 PROCEDURE — 80048 BASIC METABOLIC PNL TOTAL CA: CPT

## 2019-06-11 RX ORDER — TRAMADOL HYDROCHLORIDE 50 MG/1
100 TABLET ORAL EVERY 6 HOURS PRN
Status: DISCONTINUED | OUTPATIENT
Start: 2019-06-11 | End: 2019-06-12 | Stop reason: HOSPADM

## 2019-06-11 RX ORDER — HYDROCODONE BITARTRATE AND ACETAMINOPHEN 5; 325 MG/1; MG/1
1-2 TABLET ORAL EVERY 6 HOURS PRN
Status: DISCONTINUED | OUTPATIENT
Start: 2019-06-11 | End: 2019-06-12 | Stop reason: HOSPADM

## 2019-06-11 RX ADMIN — SERTRALINE HYDROCHLORIDE 200 MG: 100 TABLET ORAL at 20:54

## 2019-06-11 RX ADMIN — HEPARIN SODIUM 5000 UNITS: 5000 INJECTION, SOLUTION INTRAVENOUS; SUBCUTANEOUS at 16:22

## 2019-06-11 RX ADMIN — ACETAMINOPHEN 1000 MG: 500 TABLET ORAL at 00:30

## 2019-06-11 RX ADMIN — HEPARIN SODIUM 5000 UNITS: 5000 INJECTION, SOLUTION INTRAVENOUS; SUBCUTANEOUS at 20:54

## 2019-06-11 RX ADMIN — HYDROCODONE BITARTRATE AND ACETAMINOPHEN 1 TABLET: 5; 325 TABLET ORAL at 16:31

## 2019-06-11 RX ADMIN — TERAZOSIN HYDROCHLORIDE 5 MG: 5 CAPSULE ORAL at 20:54

## 2019-06-11 RX ADMIN — ACETAMINOPHEN 1000 MG: 500 TABLET ORAL at 18:09

## 2019-06-11 RX ADMIN — SENNOSIDES,DOCUSATE SODIUM 2 TABLET: 8.6; 5 TABLET, FILM COATED ORAL at 18:09

## 2019-06-11 RX ADMIN — SENNOSIDES,DOCUSATE SODIUM 2 TABLET: 8.6; 5 TABLET, FILM COATED ORAL at 05:59

## 2019-06-11 RX ADMIN — HEPARIN SODIUM 5000 UNITS: 5000 INJECTION, SOLUTION INTRAVENOUS; SUBCUTANEOUS at 05:59

## 2019-06-11 RX ADMIN — ACETAMINOPHEN 1000 MG: 500 TABLET ORAL at 05:59

## 2019-06-11 ASSESSMENT — ENCOUNTER SYMPTOMS
FEVER: 0
WEAKNESS: 1
SORE THROAT: 0
FLANK PAIN: 0
ABDOMINAL PAIN: 0
ORTHOPNEA: 0
SINUS PAIN: 0
BLOOD IN STOOL: 0
DEPRESSION: 0
HEMOPTYSIS: 0
STRIDOR: 0
NECK PAIN: 0
EYE DISCHARGE: 0
SHORTNESS OF BREATH: 0
FOCAL WEAKNESS: 1
EYE PAIN: 0
EYE REDNESS: 0
DIZZINESS: 0
PALPITATIONS: 0
SPUTUM PRODUCTION: 0
NERVOUS/ANXIOUS: 0

## 2019-06-11 ASSESSMENT — COGNITIVE AND FUNCTIONAL STATUS - GENERAL
PERSONAL GROOMING: A LITTLE
TURNING FROM BACK TO SIDE WHILE IN FLAT BAD: UNABLE
DRESSING REGULAR LOWER BODY CLOTHING: A LOT
DRESSING REGULAR UPPER BODY CLOTHING: A LITTLE
MOVING TO AND FROM BED TO CHAIR: UNABLE
SUGGESTED CMS G CODE MODIFIER DAILY ACTIVITY: CK
MOBILITY SCORE: 7
TOILETING: A LOT
MOVING FROM LYING ON BACK TO SITTING ON SIDE OF FLAT BED: UNABLE
CLIMB 3 TO 5 STEPS WITH RAILING: TOTAL
WALKING IN HOSPITAL ROOM: TOTAL
SUGGESTED CMS G CODE MODIFIER MOBILITY: CM
HELP NEEDED FOR BATHING: A LOT
DAILY ACTIVITIY SCORE: 16
STANDING UP FROM CHAIR USING ARMS: A LOT

## 2019-06-11 ASSESSMENT — GAIT ASSESSMENTS: GAIT LEVEL OF ASSIST: UNABLE TO PARTICIPATE

## 2019-06-11 NOTE — RESPIRATORY CARE
COPD EDUCATION by COPD CLINICAL EDUCATOR  6/11/2019 at 7:14 AM by Linsey Maki     Patient reviewed by COPD education team. Patient does not qualify for the COPD program.

## 2019-06-11 NOTE — PROGRESS NOTES
74yoM with right IT femur fx s/p IMN 6/7    S: complaining of pain with weightbearing, wife says hasn't been able to get out of bed much on his own.    O:  Vitals:    06/11/19 0845   BP: 141/76   Pulse: 68   Resp: 17   Temp: 37.2 °C (99 °F)   SpO2: 93%     Exam:  General-NAD, alert and following commands  RLE-dressings at thigh c/d/i, NVI distally    Hct 26.8 on 6/10    A: 74yoM with right IT femur fx s/p IMN 6/7.    Plan:  --WBAT RLE  --continue PT/OT for mobilization   --continue lovenox and SCDs while inpatient  --pending SNF placement  --fu 2 weeks postop

## 2019-06-11 NOTE — DISCHARGE PLANNING
Received Transport Form at 1345.  Spoke to Crissy at [facility].    Transport is scheduled for 6/12 at 1200 going to [facility].    SHABBIR Barker notified.

## 2019-06-11 NOTE — THERAPY
"Physical Therapy Treatment completed.   Bed Mobility:  Supine to Sit: Moderate Assist  Transfers: Sit to Stand: Maximal Assist  Gait: Level Of Assist: Unable to Participate with No Equipment Needed       Plan of Care: Will benefit from Physical Therapy 5 times per week  Discharge Recommendations: Equipment: Will Continue to Assess for Equipment Needs. Post-acute therapy Discharge to a transitional care facility for continued skilled therapy services.    Pt appears to be most limited by R LE pain and also seems to have difficulty sequencing his B LE movements. During sit to stand, pt has been educated on positioning of LE, but upon attempt, requires max A as he does not appear to be engaging LE musculature. Suspect pt is fearful of increasing his pain and is fearful of falling. He transferred to bedside chair at max A stand pivot with maximal cueing for sequencing and positioning. While here, pt will benefit from ongoing acute PT intervention to progress his mobility. Recommend placement.      See \"Rehab Therapy-Acute\" Patient Summary Report for complete documentation.       "

## 2019-06-11 NOTE — THERAPY
"Occupational Therapy Treatment completed with focus on ADLs and ADL transfers.  Functional Status: Pt seen for OT session. Seated in chair on arrival. Sit>stand with mod A (x2 ppl for safety) and FWW. 5 steps forward, turn, RB, then 5 steps back to chair with FWW and min-mod A req extra time with FWW. LB dressing with mod A; educated on adaptive techniques. C/o fatigue after steps and LB dressing. Left seated in chair with family in room and chair alarm on. Educated on small achievable goals, challenging self safely, SNF and therapy, and benefits of having pain controlled for participation in therapy and progressing toward return home. Progressing with functional mobility, but continues to be limited by decreased functional mobility, activity tolerance,, balance, and pain which are currently affecting pt's ability to complete ADLs/IADLs at baseline. Currently recommend acute OT, and Recommend inpatient transitional care services for continued occupational therapy services.     Plan of Care: Will benefit from Occupational Therapy 4 times per week  Discharge Recommendations:  Equipment Will Continue to Assess for Equipment Needs. Post-acute therapy: Recommend inpatient transitional care services for continued occupational therapy services.       See \"Rehab Therapy-Acute\" Patient Summary Report for complete documentation.   "

## 2019-06-11 NOTE — DISCHARGE PLANNING
Received Choice form at 0915.  Agency/Facility Name: [facility], [facility], and [facility].   Referral sent per Choice form at 0920.

## 2019-06-11 NOTE — DISCHARGE PLANNING
Anticipated Discharge Disposition: SNF     Action: LSW met with the Pt at bedside and got SNF choice. Pt asked that this LSW contact his wife and inform her of the SNF choice. LSW spoke with Pt wife Juana on the SNF choices. LSW said on they have a response from the SNF this LSW will let her know.  1. [facility]  2. [facility]  3. [facility]    Barriers to Discharge: SNF acceptance    Plan: follow up with SNF and Pt spouse

## 2019-06-11 NOTE — DISCHARGE PLANNING
Anticipated Discharge Disposition: [facility] SN[facility]    Action: MACYW met with the Pt to inform him that [facility] had accepted and asked if he would like to go to [facility] or the other two SNF, Pt agreed to [facility] and signed the COBRA form. LSW informed the Pt that he will leave tomorrow at 12pm.     Barriers to Discharge: none    Plan: DC to SNF

## 2019-06-11 NOTE — DISCHARGE PLANNING
Received Transport Form at 1450.  Spoke to [facility] at [facility].    Transport is scheduled for 6/12 at 1200 going to [facility].    Crissy ([facility] admissions) notified of patient now transporting via [facility]. SHABBIR Barker notified of transport time.

## 2019-06-11 NOTE — PROGRESS NOTES
[facility] Medicine Daily Progress Note    Date of Service  6/11/2019    Chief Complaint  74 y.o. male admitted 6/6/2019 with R IT femur fracture     Hospital Course    74 y.o. male admitted 6/6/2019 with R IT femur fracture, s/p surgery     Interval Problem Update  Heart rate 60s-70s, shortness of breath improving today.  Saturating well on 3-4 L of oxygen  Encourage incentive spirometry, try to wean off as tolerable.  Generalized weakness, PT OT recommending skilled nursing facility, referral and likely discharge tomorrow  Hemoglobin 8.7, down from 9.1, continue to monitor, I am ordering iron studies.  Discussed with patient, patient's nurse and with multidisciplinary team during rounds including , and charge nurse.     Consultants/Specialty  Ortho    Code Status  FULL     Disposition  SNF    Review of Systems  Review of Systems   Constitutional: Negative for fever.   HENT: Negative for sinus pain and sore throat.    Eyes: Negative for pain, discharge and redness.   Respiratory: Negative for hemoptysis, sputum production, shortness of breath and stridor.    Cardiovascular: Negative for chest pain, palpitations and orthopnea.   Gastrointestinal: Negative for abdominal pain, blood in stool and melena.   Genitourinary: Negative for dysuria, flank pain and hematuria.   Musculoskeletal: Negative for neck pain.   Skin: Negative for itching.   Neurological: Positive for focal weakness (RLE) and weakness (unchanged today). Negative for dizziness.        Stiffness of RLE   Psychiatric/Behavioral: Negative for depression. The patient is not nervous/anxious.         Physical Exam  Temp:  [36.3 °C (97.4 °F)-37.3 °C (99.2 °F)] 37.3 °C (99.2 °F)  Pulse:  [68-89] 74  Resp:  [17] 17  BP: (114-141)/(58-80) 131/80  SpO2:  [93 %-98 %] 98 %    Physical Exam   Constitutional: He is oriented to person, place, and time. He appears well-developed and well-nourished.   Interactive   HENT:   Head: Normocephalic and atraumatic.    Right Ear: External ear normal.   Left Ear: External ear normal.   Eyes: Pupils are equal, round, and reactive to light. Right eye exhibits no discharge. Left eye exhibits no discharge.   Neck: Normal range of motion. Neck supple. No JVD present. No tracheal deviation present.   Cardiovascular: Normal rate, regular rhythm and intact distal pulses.    Pulmonary/Chest: Effort normal and breath sounds normal. No stridor. He has no wheezes. He has no rales.   Abdominal: Soft. Bowel sounds are normal. There is no tenderness. There is no rebound and no guarding.   Musculoskeletal: Normal range of motion. He exhibits no tenderness.   Surgical site appears CDI-unchanged today  Warm peripherally  2+ DP B/L   Neurological: He is alert and oriented to person, place, and time. No cranial nerve deficit.   Skin: Skin is warm and dry. No erythema.   Psychiatric: He has a normal mood and affect.   Nursing note and vitals reviewed.      Fluids    Intake/Output Summary (Last 24 hours) at 06/11/19 2027  Last data filed at 06/11/19 1900   Gross per 24 hour   Intake              800 ml   Output              600 ml   Net              200 ml       Laboratory  Recent Labs      06/09/19   0359  06/10/19   0300   WBC  4.7*  4.8   RBC  2.86*  2.79*   HEMOGLOBIN  9.1*  8.7*   HEMATOCRIT  27.1*  26.8*   MCV  94.8  96.1   MCH  31.8  31.2   MCHC  33.6*  32.5*   RDW  46.1  46.8   PLATELETCT  78*  86*   MPV  10.1  10.4     Recent Labs      06/09/19   0359  06/11/19   0332   SODIUM  139  143   POTASSIUM  4.1  4.0   CHLORIDE  107  110   CO2  26  27   GLUCOSE  112*  104*   BUN  11  11   CREATININE  0.66  0.70   CALCIUM  9.2  9.6                   Imaging  DX-PORTABLE FLUOROSCOPY < 1 HOUR   Final Result         Portable fluoroscopy utilized for 1.9 minutes.      DX-FEMUR-2+ RIGHT   Final Result      Limited intraoperative images showing internal fixation of RIGHT proximal femur fracture.      DX-PELVIS-1 OR 2 VIEWS   Final Result      Nondisplaced  intertrochanteric right femoral fracture.      Mild degenerative changes of the hips.      Degenerative changes in the visualized lower lumbar spine.         DX-FEMUR-2+ RIGHT   Final Result      Nondisplaced intertrochanteric right femoral fracture.         DX-CHEST-LIMITED (1 VIEW)   Final Result      Mild hypoinflation atelectasis.              Assessment/Plan  * Closed right hip fracture, initial encounter (HCC)- (present on admission)   Assessment & Plan    Secondary to a ground level fall  s/p IM nailing POD#3   SNF referral in.  Dr. Sheldon, orthopedics, has been consulted.  Multi modal pain therapy      Acute respiratory failure with hypoxia (HCC)- (present on admission)   Assessment & Plan    Improving today after intravenous fluids were stopped, this was likely secondary to volume overload  Encourage incentive spirometer     Benign prostatic hyperplasia without lower urinary tract symptoms- (present on admission)   Assessment & Plan    Continue outpatient Hytrin, appears to be working well, but monitor blood pressure closely      RBBB (right bundle branch block with left anterior fascicular block)- (present on admission)   Assessment & Plan    Noted on EKG  There is no previous EKG to refer to  Dr. Lopez, cardiology, has reviewed and recommends outpatient follow up as he has not been symptomatic  Has no dizziness, syncope, nor near-syncope        HTN (hypertension)- (present on admission)   Assessment & Plan    Was initially hypotensive , lisinopril held   Continue to monitor          VTE prophylaxis: heparin

## 2019-06-12 VITALS
BODY MASS INDEX: 26.04 KG/M2 | HEART RATE: 71 BPM | HEIGHT: 66 IN | WEIGHT: 162.04 LBS | TEMPERATURE: 97.3 F | DIASTOLIC BLOOD PRESSURE: 56 MMHG | RESPIRATION RATE: 18 BRPM | OXYGEN SATURATION: 95 % | SYSTOLIC BLOOD PRESSURE: 116 MMHG

## 2019-06-12 PROBLEM — J96.01 ACUTE RESPIRATORY FAILURE WITH HYPOXIA (HCC): Status: RESOLVED | Noted: 2019-06-10 | Resolved: 2019-06-12

## 2019-06-12 LAB
BASOPHILS # BLD AUTO: 0.4 % (ref 0–1.8)
BASOPHILS # BLD: 0.02 K/UL (ref 0–0.12)
EOSINOPHIL # BLD AUTO: 0.14 K/UL (ref 0–0.51)
EOSINOPHIL NFR BLD: 3.1 % (ref 0–6.9)
ERYTHROCYTE [DISTWIDTH] IN BLOOD BY AUTOMATED COUNT: 46.9 FL (ref 35.9–50)
FERRITIN SERPL-MCNC: 188.9 NG/ML (ref 22–322)
HCT VFR BLD AUTO: 28.4 % (ref 42–52)
HGB BLD-MCNC: 9.1 G/DL (ref 14–18)
IMM GRANULOCYTES # BLD AUTO: 0.01 K/UL (ref 0–0.11)
IMM GRANULOCYTES NFR BLD AUTO: 0.2 % (ref 0–0.9)
IRON SATN MFR SERPL: 18 % (ref 15–55)
IRON SERPL-MCNC: 45 UG/DL (ref 50–180)
LYMPHOCYTES # BLD AUTO: 1.08 K/UL (ref 1–4.8)
LYMPHOCYTES NFR BLD: 23.6 % (ref 22–41)
MCH RBC QN AUTO: 30.8 PG (ref 27–33)
MCHC RBC AUTO-ENTMCNC: 32 G/DL (ref 33.7–35.3)
MCV RBC AUTO: 96.3 FL (ref 81.4–97.8)
MONOCYTES # BLD AUTO: 0.31 K/UL (ref 0–0.85)
MONOCYTES NFR BLD AUTO: 6.8 % (ref 0–13.4)
NEUTROPHILS # BLD AUTO: 3.01 K/UL (ref 1.82–7.42)
NEUTROPHILS NFR BLD: 65.9 % (ref 44–72)
NRBC # BLD AUTO: 0 K/UL
NRBC BLD-RTO: 0 /100 WBC
PLATELET # BLD AUTO: 127 K/UL (ref 164–446)
PMV BLD AUTO: 10 FL (ref 9–12.9)
RBC # BLD AUTO: 2.95 M/UL (ref 4.7–6.1)
TIBC SERPL-MCNC: 253 UG/DL (ref 250–450)
WBC # BLD AUTO: 4.6 K/UL (ref 4.8–10.8)

## 2019-06-12 PROCEDURE — 85025 COMPLETE CBC W/AUTO DIFF WBC: CPT

## 2019-06-12 PROCEDURE — 99239 HOSP IP/OBS DSCHRG MGMT >30: CPT | Performed by: HOSPITALIST

## 2019-06-12 PROCEDURE — 82728 ASSAY OF FERRITIN: CPT

## 2019-06-12 PROCEDURE — 36415 COLL VENOUS BLD VENIPUNCTURE: CPT

## 2019-06-12 PROCEDURE — 700111 HCHG RX REV CODE 636 W/ 250 OVERRIDE (IP): Performed by: INTERNAL MEDICINE

## 2019-06-12 PROCEDURE — 700102 HCHG RX REV CODE 250 W/ 637 OVERRIDE(OP): Performed by: INTERNAL MEDICINE

## 2019-06-12 PROCEDURE — 83540 ASSAY OF IRON: CPT

## 2019-06-12 PROCEDURE — 83550 IRON BINDING TEST: CPT

## 2019-06-12 PROCEDURE — A9270 NON-COVERED ITEM OR SERVICE: HCPCS | Performed by: PHYSICIAN ASSISTANT

## 2019-06-12 PROCEDURE — 700102 HCHG RX REV CODE 250 W/ 637 OVERRIDE(OP): Performed by: PHYSICIAN ASSISTANT

## 2019-06-12 PROCEDURE — A9270 NON-COVERED ITEM OR SERVICE: HCPCS | Performed by: INTERNAL MEDICINE

## 2019-06-12 PROCEDURE — 97535 SELF CARE MNGMENT TRAINING: CPT

## 2019-06-12 RX ORDER — HYDROCODONE BITARTRATE AND ACETAMINOPHEN 5; 325 MG/1; MG/1
1 TABLET ORAL EVERY 8 HOURS PRN
Qty: 9 TAB | Refills: 0 | Status: SHIPPED | OUTPATIENT
Start: 2019-06-12 | End: 2019-06-15

## 2019-06-12 RX ORDER — ACETAMINOPHEN 500 MG
1000 TABLET ORAL EVERY 6 HOURS
Qty: 30 TAB | Refills: 0
Start: 2019-06-12 | End: 2020-05-18

## 2019-06-12 RX ORDER — HEPARIN SODIUM 5000 [USP'U]/ML
5000 INJECTION, SOLUTION INTRAVENOUS; SUBCUTANEOUS EVERY 8 HOURS
Refills: 0
Start: 2019-06-12 | End: 2020-05-18

## 2019-06-12 RX ADMIN — HYDROCODONE BITARTRATE AND ACETAMINOPHEN 1 TABLET: 5; 325 TABLET ORAL at 09:45

## 2019-06-12 RX ADMIN — HEPARIN SODIUM 5000 UNITS: 5000 INJECTION, SOLUTION INTRAVENOUS; SUBCUTANEOUS at 05:16

## 2019-06-12 RX ADMIN — ACETAMINOPHEN 1000 MG: 500 TABLET ORAL at 05:16

## 2019-06-12 RX ADMIN — HYDROCODONE BITARTRATE AND ACETAMINOPHEN 1 TABLET: 5; 325 TABLET ORAL at 10:10

## 2019-06-12 ASSESSMENT — COGNITIVE AND FUNCTIONAL STATUS - GENERAL
TOILETING: A LOT
DRESSING REGULAR LOWER BODY CLOTHING: A LOT
DAILY ACTIVITIY SCORE: 16
PERSONAL GROOMING: A LITTLE
SUGGESTED CMS G CODE MODIFIER DAILY ACTIVITY: CK
HELP NEEDED FOR BATHING: A LOT
DRESSING REGULAR UPPER BODY CLOTHING: A LITTLE

## 2019-06-12 NOTE — DISCHARGE INSTRUCTIONS
Discharge Instructions    Discharged to other by medical transportation with escort. Discharged via ambulance, hospital escort: Yes.  Special equipment needed: Not Applicable    Be sure to schedule a follow-up appointment with your primary care doctor or any specialists as instructed.     Discharge Plan:   Diet Plan: Discussed  Activity Level: Discussed  Confirmed Follow up Appointment: Patient to Call and Schedule Appointment  Confirmed Symptoms Management: Discussed  Medication Reconciliation Updated: Yes  Pneumococcal Vaccine Administered/Refused: Not given - Patient refused pneumococcal vaccine  Influenza Vaccine Indication: Not indicated: Previously immunized this influenza season and > 8 years of age    I understand that a diet low in cholesterol, fat, and sodium is recommended for good health. Unless I have been given specific instructions below for another diet, I accept this instruction as my diet prescription.   Other diet: Regular    Special Instructions: Discharge instructions for the Orthopedic Patient    Follow up with Primary Care Physician within 2 weeks of discharge to home, regarding:  Review of medications and diagnostic testing.  Surveillance for medical complications.  Workup and treatment of osteoporosis, if appropriate.     -Is this a Joint Replacement patient? No    -Is this patient being discharged with medication to prevent blood clots?  Yes, Heparin Heparin injection  What is this medicine?  HEPARIN (HEP a rin) is an anticoagulant. It is used to treat or prevent clots in the veins, arteries, lungs, or heart. It stops clots from forming or getting bigger. This medicine prevents clotting during open-heart surgery, dialysis, or in patients who are confined to bed.  This medicine may be used for other purposes; ask your health care provider or pharmacist if you have questions.  COMMON BRAND NAME(S): Hep-Lock, Hep-Lock U/P, Hepflush-10, Monoject Prefill Advanced Heparin Lock Flush  What should  I tell my health care provider before I take this medicine?  They need to know if you have any of these conditions:  -bleeding disorders, such as hemophilia or low blood platelets  -bowel disease or diverticulitis  -endocarditis  -high blood pressure  -liver disease  -recent surgery or delivery of a baby  -stomach ulcers  -an unusual or allergic reaction to heparin, benzyl alcohol, sulfites, other medicines, foods, dyes, or preservatives  -pregnant or trying to get pregnant  -breast-feeding  How should I use this medicine?  This medicine is given by injection or infusion into a vein. It can also be given by injection of small amounts under the skin. It is usually given by a health care professional in a hospital or clinic setting.  If you get this medicine at home, you will be taught how to prepare and give this medicine. Use exactly as directed. Take your medicine at regular intervals. Do not take it more often than directed. Do not stop taking except on your doctor's advice. Stopping this medicine may increase your risk of a blot clot. Be sure to refill your prescription before you run out of medicine.  It is important that you put your used needles and syringes in a special sharps container. Do not put them in a trash can. If you do not have a sharps container, call your pharmacist or healthcare provider to get one.  Talk to your pediatrician regarding the use of this medicine in children. While this medicine may be prescribed for children for selected conditions, precautions do apply.  Overdosage: If you think you have taken too much of this medicine contact a poison control center or emergency room at once.  NOTE: This medicine is only for you. Do not share this medicine with others.  What if I miss a dose?  If you miss a dose, take it as soon as you can. If it is almost time for your next dose, take only that dose. Do not take double or extra doses.  What may interact with this medicine?  Do not take this  medicine with any of the following medications:  -aspirin and aspirin-like drugs  -mifepristone  -medicines that treat or prevent blood clots like warfarin, enoxaparin, and dalteparin  -palifermin  -protamine  This medicine may also interact with the following medications:  -dextran  -digoxin  -hydroxychloroquine  -medicines for treating colds or allergies  -nicotine  -NSAIDs, medicines for pain and inflammation, like ibuprofen or naproxen  -phenylbutazone  -tetracycline antibiotics  This list may not describe all possible interactions. Give your health care provider a list of all the medicines, herbs, non-prescription drugs, or dietary supplements you use. Also tell them if you smoke, drink alcohol, or use illegal drugs. Some items may interact with your medicine.  What should I watch for while using this medicine?  While you are taking this medicine, carry an identification card with your name, the name and dose of medicine(s) being used, and the name and phone number of your doctor or health care professional or person to contact in an emergency.  Notify your doctor or health care professional and seek emergency treatment if you develop breathing problems; changes in vision; chest pain; severe, sudden headache; pain, swelling, warmth in the leg; trouble speaking; sudden numbness or weakness of the face, arm, or leg. These can be signs that your condition has gotten worse.  Notify your doctor or health care professional at once if you have cold, blue hands or feet.  If you are going to have surgery or dental work, tell your doctor or health care professional that you have received this medicine. Be careful brushing and flossing your teeth or using a toothpick while receiving this medicine because you may bleed more easily.  Avoid sports and activities that might cause injury while you are using this medicine. Severe falls or injuries can cause unseen bleeding. Be careful when using sharp tools or knives. Consider  using an electric razor.  What side effects may I notice from receiving this medicine?  Side effects that you should report to your doctor or health care professional as soon as possible:  -allergic reactions like skin rash, itching or hives, swelling of the face, lips, or tongue  -back pain  -burning or itching on the bottoms of the feet  -cold, blue, or painful hands and feet  -feeling faint or lightheaded, falls  -fever, chills  -nausea, vomiting  -signs and symptoms of bleeding such as bloody or black, tarry stools; red or dark-brown urine; spitting up blood or brown material that looks like coffee grounds; red spots on the skin; unusual bruising or bleeding from the eye, gums, or nose  -stomach pain  -unusually low blood pressure  Side effects that usually do not require medical attention (report to your doctor or health care professional if they continue or are bothersome):  -pain, redness, or irritation at site where injected  This list may not describe all possible side effects. Call your doctor for medical advice about side effects. You may report side effects to FDA at 2-301-FDA-0526.  Where should I keep my medicine?  Keep out of the reach of children.  Store unopened vials at room temperature between 15 and 30 degrees C (59 and 86 degrees F). Do not freeze. Do not use if solution is discolored or particulate matter is present. Throw away any unused medicine after the expiration date.  NOTE: This sheet is a summary. It may not cover all possible information. If you have questions about this medicine, talk to your doctor, pharmacist, or health care provider.  © 2018 Elsevier/Gold Standard (2014-04-15 16:19:24)      · Is patient discharged on Warfarin / Coumadin?   No       Hip Fracture  A hip fracture is a fracture of the upper part of your thigh bone (femur).  What are the causes?  A hip fracture is caused by a direct blow to the side of your hip. This is usually the result of a fall but can occur in  other circumstances, such as an automobile accident.  What increases the risk?  There is an increased risk of hip fractures in people with:  · An unsteady walking pattern (gait) and those with conditions that contribute to poor balance, such as Parkinson's disease or dementia.  · Osteopenia and osteoporosis.  · Cancer that spreads to the leg bones.  · Certain metabolic diseases.  What are the signs or symptoms?  Symptoms of hip fracture include:  · Pain over the injured hip.  · Inability to put weight on the leg in which the fracture occurred (although, some patients are able to walk after a hip fracture).  · Toes and foot of the affected leg point outward when you lie down.  How is this diagnosed?  A physical exam can determine if a hip fracture is likely to have occurred. X-ray exams are needed to confirm the fracture and to look for other injuries. The X-ray exam can help to determine the type of hip fracture. Rarely, the fracture is not visible on an X-ray image and a CT scan or MRI will have to be done.  How is this treated?  The treatment for a fracture is usually surgery. This means using a screw, nail, or aydin to hold the bones in place.  Follow these instructions at home:  Take all medicines as directed by your health care provider.  Contact a health care provider if:  Pain continues, even after taking pain medicine.  This information is not intended to replace advice given to you by your health care provider. Make sure you discuss any questions you have with your health care provider.  Document Released: 12/18/2006 Document Revised: 05/25/2017 Document Reviewed: 07/30/2014  [employer] Interactive Patient Education © 2017 Elsevier Inc.      Depression / Suicide Risk    As you are discharged from this [facility] facility, it is important to learn how to keep safe from harming yourself.    Recognize the warning signs:  · Abrupt changes in personality, positive or negative- including increase in energy    · Giving away possessions  · Change in eating patterns- significant weight changes-  positive or negative  · Change in sleeping patterns- unable to sleep or sleeping all the time   · Unwillingness or inability to communicate  · Depression  · Unusual sadness, discouragement and loneliness  · Talk of wanting to die  · Neglect of personal appearance   · Rebelliousness- reckless behavior  · Withdrawal from people/activities they love  · Confusion- inability to concentrate     If you or a loved one observes any of these behaviors or has concerns about self-harm, here's what you can do:  · Talk about it- your feelings and reasons for harming yourself  · Remove any means that you might use to hurt yourself (examples: pills, rope, extension cords, firearm)  · Get professional help from the community (Mental Health, Substance Abuse, psychological counseling)  · Do not be alone:Call your Safe Contact- someone whom you trust who will be there for you.  · Call your local CRISIS HOTLINE 727-6562 or 658-828-1517  · Call your local Children's Mobile Crisis Response Team [place] (315) 684-1933 or www.[place]  · Call the toll free National Suicide Prevention Hotlines   · National Suicide Prevention Lifeline 516-273-YSTE (6027)  · National Hope Line Network 800-SUICIDE (713-5374)
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
## 2019-06-12 NOTE — CARE PLAN
Problem: Safety  Goal: Will remain free from falls    Intervention: Assess risk factors for falls  Patient on a Moderate risk for fall; Bed alarm utilized.       Problem: Venous Thromboembolism (VTW)/Deep Vein Thrombosis (DVT) Prevention:  Goal: Patient will participate in Venous Thrombosis (VTE)/Deep Vein Thrombosis (DVT)Prevention Measures    Intervention: Assess and monitor for anticoagulation complications  Continues on anticoagulant therapy; no visible s/s of active bleeding noted.

## 2019-06-12 NOTE — PROGRESS NOTES
"   Orthopaedic Progress Note    Interval changes:  Patient doing well   Pain meds changed to scheduled ultram 100mg po Q6 and prn norco 5mg  Cleared for DC home vs SNF by ortho pending medicine and therapy clearance    ROS - Patient denies any new issues.  Pain well controlled.    /80   Pulse 74   Temp 37.3 °C (99.2 °F) (Oral)   Resp 17   Ht 1.676 m (5' 6\")   Wt 73.5 kg (162 lb 0.6 oz)   SpO2 98%       Patient seen and examined  No acute distress  Breathing non labored  RRR  RLE surgical dressings are clean, dry, and intact. Patient clearly fires tibialis anterior, EHL, and gastrocnemius/soleus. Sensation is intact to light touch throughout superficial peroneal, deep peroneal, tibial, saphenous, and sural nerve distributions. Strong and palpable 2+ dorsalis pedis and posterior tibial pulses with capillary refill less than 2 seconds. No lower leg tenderness or discomfort.        Recent Labs      06/09/19   0359  06/10/19   0300   WBC  4.7*  4.8   RBC  2.86*  2.79*   HEMOGLOBIN  9.1*  8.7*   HEMATOCRIT  27.1*  26.8*   MCV  94.8  96.1   MCH  31.8  31.2   MCHC  33.6*  32.5*   RDW  46.1  46.8   PLATELETCT  78*  86*   MPV  10.1  10.4       Active Hospital Problems    Diagnosis   • Acute respiratory failure with hypoxia ([facility]) [J96.01]   • RBBB (right bundle branch block with left anterior fascicular block) [I45.2]   • Closed right hip fracture, initial encounter ([facility]) [S72.001A]   • Benign prostatic hyperplasia without lower urinary tract symptoms [N40.0]   • HTN (hypertension) [I10]       Assessment/Plan:  Cleared for DC to SNF by ortho pending medicine and therapy clearance  Pain meds changed to norco 5 and scheduled ultram 100mg po Q6  POD#4 S/P Open treatment of right intertrochanteric femur fracture with intramedullary nailing.  Wt bearing status - WBAT RLE  Wound care/Drains - dressing left in place  Future Procedures - none planned  Sutures/Staples out- 10-14 days post " operatively  PT/OT-initiated  Antibiotics: completed  DVT Prophylaxis- TEDS/SCDs/Foot pumps/heparin  Reza-none  Case Coordination for Discharge Planning - Disposition SNF tomorrow

## 2019-06-12 NOTE — THERAPY
"Occupational Therapy Treatment completed with focus on ADLs and ADL transfers.  Functional Status: Pt seen for OT session. Seated in chair on arrival. Sit>stand with min A and FWW. 5 steps to sink. Standing oral care, educated on safety for balance at sink and adaptive techniques. 1 LOB at sink, req mod A to correct. Back to chair with min A, and complete seated shaving with SPV. Left seated in chair; reiterated importance of safety and calling before getting up, d/t LOB. Progressing with functional mobility, but continues to be limited by decreased functional mobility, activity tolerance, cognition, balance, and pain which are currently affecting pt's ability to complete ADLs/IADLs at baseline. Currently recommend acute OT, and inpatient transitional care services for continued occupational therapy services.     Plan of Care: Will benefit from Occupational Therapy 4 times per week  Discharge Recommendations:  Equipment Will Continue to Assess for Equipment Needs. Post-acute therapy: Recommend inpatient transitional care services for continued occupational therapy services.         See \"Rehab Therapy-Acute\" Patient Summary Report for complete documentation.   "

## 2019-06-12 NOTE — CARE PLAN
Problem: Safety  Goal: Will remain free from falls  Non-skid socks on and belongings within reach. Call light within reach. Hourly rounding in place.      Problem: Venous Thromboembolism (VTW)/Deep Vein Thrombosis (DVT) Prevention:  Goal: Patient will participate in Venous Thrombosis (VTE)/Deep Vein Thrombosis (DVT)Prevention Measures    SCDs to BLE. Heparin for DVT prophylaxis per MAR. Mobilization at least three times a day with staff and PT/OT.

## 2019-06-12 NOTE — PROGRESS NOTES
MDs have signed off on patient to discharge today. Patient will be leaving today with [facility] to go to [facility]. Discharge instructions given and patient has no questions or concerns at this time. Prescriptions given to transporter in packet at bedside. DME not needed at this time. Attempted to call report to RN at 1200, no answer, will continue to reattempt.

## 2019-06-17 NOTE — DOCUMENTATION QUERY
[facility]                                                                       Query Response Note      PATIENT:               PATRICIA TAMAYO  ACCT #:                  [id number]  MRN:                     [id number]  :                      1945  ADMIT DATE:       2019 12:33 PM  DISCH DATE:        2019 12:28 PM  RESPONDING  PROVIDER #:        [id number]           QUERY TEXT:    Depression is documented in the Medical Record.  Please specify the type.    NOTE:  If an appropriate response is not listed below, please respond with a new note.              The patient's Clinical Indicators include:  ED, HP, PN-Depression  Generalized anxiety disorder  allergic to Xanax    Treatment:  Zoloft    Query created by: Viky Candelario on 2019 10:12 AM    RESPONSE TEXT:    MDD, single episode, in full remission          Electronically signed by:  DAVE GROVE MD 2019 4:00 PM

## 2019-09-23 RX ORDER — SERTRALINE HYDROCHLORIDE 100 MG/1
TABLET, FILM COATED ORAL
Qty: 60 TAB | Refills: 0 | Status: SHIPPED | OUTPATIENT
Start: 2019-09-23 | End: 2019-11-05 | Stop reason: SDUPTHER

## 2019-09-23 NOTE — TELEPHONE ENCOUNTER
Pt was told 5/19 to make appt and that has not been done. Please advise pt only 30 days will be sent to pharmacy and next request will be denied.

## 2019-09-23 NOTE — TELEPHONE ENCOUNTER
Was the patient seen in the last year in this department? No     Does patient have an active prescription for medications requested? No     Received Request Via: Pharmacy      Pt met protocol?: No   Pt last ov 6/18 has no upcoming appt

## 2019-09-23 NOTE — TELEPHONE ENCOUNTER
Phone Number Called: [phone] (home)       Call outcome: spoke to patient regarding message below    Message: patient advised and will schedule his appointment if he decides that he will require a refill.

## 2019-11-05 ENCOUNTER — OFFICE VISIT (OUTPATIENT)
Dept: MEDICAL GROUP | Facility: PHYSICIAN GROUP | Age: 74
End: 2019-11-05
Payer: MEDICARE

## 2019-11-05 VITALS
RESPIRATION RATE: 20 BRPM | TEMPERATURE: 97.8 F | OXYGEN SATURATION: 92 % | BODY MASS INDEX: 30.37 KG/M2 | HEART RATE: 102 BPM | HEIGHT: 66 IN | DIASTOLIC BLOOD PRESSURE: 80 MMHG | SYSTOLIC BLOOD PRESSURE: 122 MMHG | WEIGHT: 189 LBS

## 2019-11-05 DIAGNOSIS — R05.9 COUGH: ICD-10-CM

## 2019-11-05 DIAGNOSIS — D64.9 ANEMIA, UNSPECIFIED TYPE: ICD-10-CM

## 2019-11-05 DIAGNOSIS — E66.9 OBESITY (BMI 30-39.9): ICD-10-CM

## 2019-11-05 DIAGNOSIS — N40.0 BENIGN PROSTATIC HYPERPLASIA WITHOUT LOWER URINARY TRACT SYMPTOMS: ICD-10-CM

## 2019-11-05 DIAGNOSIS — Z23 NEED FOR VACCINATION: ICD-10-CM

## 2019-11-05 DIAGNOSIS — E55.9 VITAMIN D INSUFFICIENCY: ICD-10-CM

## 2019-11-05 DIAGNOSIS — F33.41 RECURRENT MAJOR DEPRESSIVE DISORDER, IN PARTIAL REMISSION (HCC): ICD-10-CM

## 2019-11-05 DIAGNOSIS — I10 ESSENTIAL HYPERTENSION: ICD-10-CM

## 2019-11-05 PROCEDURE — G0009 ADMIN PNEUMOCOCCAL VACCINE: HCPCS | Performed by: NURSE PRACTITIONER

## 2019-11-05 PROCEDURE — 90670 PCV13 VACCINE IM: CPT | Performed by: NURSE PRACTITIONER

## 2019-11-05 PROCEDURE — 90662 IIV NO PRSV INCREASED AG IM: CPT | Performed by: NURSE PRACTITIONER

## 2019-11-05 PROCEDURE — G0008 ADMIN INFLUENZA VIRUS VAC: HCPCS | Performed by: NURSE PRACTITIONER

## 2019-11-05 PROCEDURE — 99214 OFFICE O/P EST MOD 30 MIN: CPT | Mod: 25 | Performed by: NURSE PRACTITIONER

## 2019-11-05 RX ORDER — SERTRALINE HYDROCHLORIDE 100 MG/1
200 TABLET, FILM COATED ORAL
Qty: 180 TAB | Refills: 3 | Status: SHIPPED | OUTPATIENT
Start: 2019-11-05 | End: 2021-06-01

## 2019-11-05 RX ORDER — TERAZOSIN 5 MG/1
5 CAPSULE ORAL DAILY
Qty: 90 CAP | Refills: 3 | Status: SHIPPED | OUTPATIENT
Start: 2019-11-05

## 2019-11-05 RX ORDER — LISINOPRIL 40 MG/1
TABLET ORAL
Qty: 90 TAB | Refills: 1 | Status: SHIPPED | OUTPATIENT
Start: 2019-11-05 | End: 2020-04-30

## 2019-11-05 NOTE — PROGRESS NOTES
Chief Complaint   Patient presents with   • Medication Refill     Terazosin, sertraline, lisinopril    • Cough         This is a 74 y.o.male patient that presents today with the following: Follow-up visit, medication refills, persistent cough    Benign prostatic hyperplasia without lower urinary tract symptoms  This is a chronic condition, stable, he is on terazosin 5 mg daily, he is closely followed by urology who also monitors annual PSA.  He does need refills, this is called in for him.    Anemia  Patient does have past history of iron deficiency anemia, he is past due for labs, these have been ordered.  Not currently taking iron supplement.  He does not complain of symptoms associated with anemia.    HTN (hypertension)  Is a chronic and stable condition, well-controlled on lisinopril 40 mg daily, blood pressure today within normal limits, he does need refills on lisinopril, this is called in for him.  He is due for labs, these have been ordered.  He is going to follow-up with me in 6 months, sooner if needed.    Depression  Chronic and stable, well controlled on sertraline 200 mg daily, does need refills, these were called in for him.  Tolerates this well with no significant or bothersome side effects, denies suicidal and homicidal ideations, hallucinations, racing thoughts and flights of ideas.    Vitamin D insufficiency  Patient does have history of vitamin D deficiency, not currently taking over-the-counter vitamin D supplement, due for labs.    Cough  Patient does have chronic cough associated with his seasonal allergies, this is occasionally productive of clear phlegm, he does note nasal drainage as well as postnasal drip.  He is not currently taking second or third generation antihistamine, is advised to start 1 of these as they are available over-the-counter.  He was advised to take this consistently over the next 60 to 90 days.  He is to follow-up with me if his symptoms do not improve or if he develops  any other associated systemic symptoms.    Obesity (BMI 30-39.9)  Patient weight is 189 pounds, BMI 30.51.  He continues to work on this, we discussed lifestyle modifications.      No visits with results within 1 Month(s) from this visit.   Latest known visit with results is:   Admission on 2019, Discharged on 2019   Component Date Value   • Report 2019                      Value:[facility]    Test Date:  2019  Pt Name:    PATRICIA TAMYAO              Department: ER  MRN:        [id number]                      Room:        18  Gender:     Male                         Technician: [id number]  :        1945                   Requested By:MAGGIE BACA  Order #:    [id number]                    Reading MD: MAGGIE BACA MD    Measurements  Intervals                                Axis  Rate:       75                           P:          50  GA:         216                          QRS:        -27  QRSD:       142                          T:          5  QT:         400  QTc:        447    Interpretive Statements  SINUS RHYTHM  BORDERLINE AV CONDUCTION DELAY  RBBB AND LAFB  LEFT VENTRICULAR HYPERTROPHY  No previous ECG available for comparison    Electronically Signed On 2019 13:15:59 PDT by MAGGIE BACA MD     • WBC 2019 7.9    • RBC 2019 3.94*   • Hemoglobin 2019 12.4*   • Hematocrit 2019 37.2*   • MCV 2019 94.4    • MCH 2019 31.5    • MCHC 2019 33.3*   • RDW 2019 47.5    • Platelet Count 2019 119*   • MPV 2019 10.2    • Neutrophils-Polys 2019 82.20*   • Lymphocytes 2019 10.90*   • Monocytes 2019 5.20    • Eosinophils 2019 0.90    • Basophils 2019 0.30    • Immature Granulocytes 2019 0.50    • Nucleated RBC 2019 0.00    • Neutrophils (Absolute) 2019 6.46    • Lymphs (Absolute) 2019 0.86*   • Monos (Absolute) 2019 0.41    • Eos  (Absolute) 06/06/2019 0.07    • Baso (Absolute) 06/06/2019 0.02    • Immature Granulocytes (a* 06/06/2019 0.04    • NRBC (Absolute) 06/06/2019 0.00    • Sodium 06/06/2019 140    • Potassium 06/06/2019 4.5    • Chloride 06/06/2019 112    • Co2 06/06/2019 22    • Anion Gap 06/06/2019 6.0    • Glucose 06/06/2019 101*   • Bun 06/06/2019 19    • Creatinine 06/06/2019 1.22    • Calcium 06/06/2019 10.1    • AST(SGOT) 06/06/2019 28    • ALT(SGPT) 06/06/2019 18    • Alkaline Phosphatase 06/06/2019 69    • Total Bilirubin 06/06/2019 1.0    • Albumin 06/06/2019 4.0    • Total Protein 06/06/2019 6.2    • Globulin 06/06/2019 2.2    • A-G Ratio 06/06/2019 1.8    • PT 06/06/2019 16.0*   • INR 06/06/2019 1.25*   • APTT 06/06/2019 25.4    • GFR If  06/06/2019 >60    • GFR If Non  [employer]meri* 06/06/2019 58*   • Sodium 06/08/2019 135    • Potassium 06/08/2019 4.1    • Chloride 06/08/2019 106    • Co2 06/08/2019 25    • Glucose 06/08/2019 124*   • Bun 06/08/2019 15    • Creatinine 06/08/2019 0.75    • Calcium 06/08/2019 9.1    • Anion Gap 06/08/2019 4.0    • WBC 06/08/2019 5.7    • RBC 06/08/2019 2.91*   • Hemoglobin 06/08/2019 9.2*   • Hematocrit 06/08/2019 28.3*   • MCV 06/08/2019 97.3    • MCH 06/08/2019 31.6    • MCHC 06/08/2019 32.5*   • RDW 06/08/2019 49.6    • Platelet Count 06/08/2019 80*   • MPV 06/08/2019 10.0    • Neutrophils-Polys 06/08/2019 74.00*   • Lymphocytes 06/08/2019 15.20*   • Monocytes 06/08/2019 8.80    • Eosinophils 06/08/2019 1.60    • Basophils 06/08/2019 0.20    • Immature Granulocytes 06/08/2019 0.20    • Nucleated RBC 06/08/2019 0.00    • Neutrophils (Absolute) 06/08/2019 4.23    • Lymphs (Absolute) 06/08/2019 0.87*   • Monos (Absolute) 06/08/2019 0.50    • Eos (Absolute) 06/08/2019 0.09    • Baso (Absolute) 06/08/2019 0.01    • Immature Granulocytes (a* 06/08/2019 0.01    • NRBC (Absolute) 06/08/2019 0.00    • GFR If  06/08/2019 >60    • GFR If Non  Ameri*  06/08/2019 >60    • WBC 06/09/2019 4.7*   • RBC 06/09/2019 2.86*   • Hemoglobin 06/09/2019 9.1*   • Hematocrit 06/09/2019 27.1*   • MCV 06/09/2019 94.8    • MCH 06/09/2019 31.8    • MCHC 06/09/2019 33.6*   • RDW 06/09/2019 46.1    • Platelet Count 06/09/2019 78*   • MPV 06/09/2019 10.1    • Neutrophils-Polys 06/09/2019 70.90    • Lymphocytes 06/09/2019 16.20*   • Monocytes 06/09/2019 9.30    • Eosinophils 06/09/2019 3.00    • Basophils 06/09/2019 0.20    • Immature Granulocytes 06/09/2019 0.40    • Nucleated RBC 06/09/2019 0.00    • Neutrophils (Absolute) 06/09/2019 3.36    • Lymphs (Absolute) 06/09/2019 0.77*   • Monos (Absolute) 06/09/2019 0.44    • Eos (Absolute) 06/09/2019 0.14    • Baso (Absolute) 06/09/2019 0.01    • Immature Granulocytes (a* 06/09/2019 0.02    • NRBC (Absolute) 06/09/2019 0.00    • Sodium 06/09/2019 139    • Potassium 06/09/2019 4.1    • Chloride 06/09/2019 107    • Co2 06/09/2019 26    • Glucose 06/09/2019 112*   • Bun 06/09/2019 11    • Creatinine 06/09/2019 0.66    • Calcium 06/09/2019 9.2    • Anion Gap 06/09/2019 6.0    • GFR If  06/09/2019 >60    • GFR If Non  Ameri* 06/09/2019 >60    • WBC 06/10/2019 4.8    • RBC 06/10/2019 2.79*   • Hemoglobin 06/10/2019 8.7*   • Hematocrit 06/10/2019 26.8*   • MCV 06/10/2019 96.1    • MCH 06/10/2019 31.2    • MCHC 06/10/2019 32.5*   • RDW 06/10/2019 46.8    • Platelet Count 06/10/2019 86*   • MPV 06/10/2019 10.4    • Neutrophils-Polys 06/10/2019 66.10    • Lymphocytes 06/10/2019 22.40    • Monocytes 06/10/2019 6.50    • Eosinophils 06/10/2019 4.20    • Basophils 06/10/2019 0.40    • Immature Granulocytes 06/10/2019 0.40    • Nucleated RBC 06/10/2019 0.00    • Neutrophils (Absolute) 06/10/2019 3.16    • Lymphs (Absolute) 06/10/2019 1.07    • Monos (Absolute) 06/10/2019 0.31    • Eos (Absolute) 06/10/2019 0.20    • Baso (Absolute) 06/10/2019 0.02    • Immature Granulocytes (a* 06/10/2019 0.02    • NRBC (Absolute) 06/10/2019 0.00     • Sodium 06/11/2019 143    • Potassium 06/11/2019 4.0    • Chloride 06/11/2019 110    • Co2 06/11/2019 27    • Glucose 06/11/2019 104*   • Bun 06/11/2019 11    • Creatinine 06/11/2019 0.70    • Calcium 06/11/2019 9.6    • Anion Gap 06/11/2019 6.0    • GFR If  06/11/2019 >60    • GFR If Non  Ameri* 06/11/2019 >60    • WBC 06/12/2019 4.6*   • RBC 06/12/2019 2.95*   • Hemoglobin 06/12/2019 9.1*   • Hematocrit 06/12/2019 28.4*   • MCV 06/12/2019 96.3    • MCH 06/12/2019 30.8    • MCHC 06/12/2019 32.0*   • RDW 06/12/2019 46.9    • Platelet Count 06/12/2019 127*   • MPV 06/12/2019 10.0    • Neutrophils-Polys 06/12/2019 65.90    • Lymphocytes 06/12/2019 23.60    • Monocytes 06/12/2019 6.80    • Eosinophils 06/12/2019 3.10    • Basophils 06/12/2019 0.40    • Immature Granulocytes 06/12/2019 0.20    • Nucleated RBC 06/12/2019 0.00    • Neutrophils (Absolute) 06/12/2019 3.01    • Lymphs (Absolute) 06/12/2019 1.08    • Monos (Absolute) 06/12/2019 0.31    • Eos (Absolute) 06/12/2019 0.14    • Baso (Absolute) 06/12/2019 0.02    • Immature Granulocytes (a* 06/12/2019 0.01    • NRBC (Absolute) 06/12/2019 0.00    • Iron 06/12/2019 45*   • Total Iron Binding 06/12/2019 253    • % Saturation 06/12/2019 18    • Ferritin 06/12/2019 188.9          clinical course has been stable    Past Medical History:   Diagnosis Date   • Allergic rhinitis, cause unspecified    • Anxiety state, unspecified    • Degeneration of lumbar or lumbosacral intervertebral disc    • Depressive disorder, not elsewhere classified    • Esophageal reflux    • Essential hypertension, malignant    • Generalized anxiety disorder 11/10/2009   • History of kidney stones    • Hypertrophy of prostate with urinary obstruction and other lower urinary tract symptoms (LUTS)    • Ingrowing nail     RT GREAT TOE   • Insomnia, unspecified    • Right bundle branch block and left anterior fascicular block    • SOB (shortness of breath) 10/24/2014   •  "Unspecified deficiency anemia    • Unspecified hemorrhagic conditions     nose bleeds       Past Surgical History:   Procedure Laterality Date   • PB OPEN FIX INTER/SUBTROCH FX,IMPLNT Right 6/7/2019    Procedure: INSERTION, INTRAMEDULLARY LOULOU, FEMUR, PROXIMAL;  Surgeon: Kendrick Sheldon M.D.;  Location: SURGERY [facility];  Service: Orthopedics   • PARATHYROID EXPLORATION  7/25/2012    Performed by GURVINDER KENDALL at SURGERY SAME DAY [facility] ORS   • LUMBAR LAMINECTOMY DISKECTOMY  2009   • CERVICAL DISK AND FUSION ANTERIOR  2009   • INGUINAL HERNIA REPAIR      left   • LITHOTRIPSY      \"several years ago\"       Family History   Problem Relation Age of Onset   • Cancer Mother         LIVER   • Hypertension Mother    • No Known Problems Father    • No Known Problems Sister    • No Known Problems Brother        Xanax [alprazolam]    Current Outpatient Medications Ordered in Epic   Medication Sig Dispense Refill   • sertraline (ZOLOFT) 100 MG Tab Take 2 Tabs by mouth every day. 180 Tab 3   • terazosin (HYTRIN) 5 MG Cap Take 1 Cap by mouth every day. 90 Cap 3   • lisinopril (PRINIVIL) 40 MG tablet TAKE 1 TABLET BY MOUTH DAILY. 90 Tab 1   • acetaminophen (TYLENOL) 500 MG Tab Take 2 Tabs by mouth every 6 hours. 30 Tab 0   • heparin 5000 UNIT/ML Solution Inject 1 mL as instructed every 8 hours.  0   • Multiple Vitamins-Minerals (CENTRUM SILVER ADULT 50+ PO) Take 1 Tab by mouth every day.       No current [facility]-ordered facility-administered medications on file.        Constitutional ROS: No unexpected change in weight, No weakness, No unexplained fevers, sweats, or chills  Pulmonary ROS: Positive for cough   Cardiovascular ROS: No chest pain, No edema, No palpitations, Positive for hypertension   Gastrointestinal ROS: No abdominal pain, No nausea, vomiting, diarrhea, or constipation  Musculoskeletal/Extremities ROS: No clubbing, No peripheral edema, No pain, redness or swelling on the joints  Neurologic ROS: Normal " "development, No seizures, No weakness  Psychiatric ROS: Positive per HPI   ROS: Positive per HPI    Physical exam:  /80   Pulse (!) 102   Temp 36.6 °C (97.8 °F) (Temporal)   Resp 20   Ht 1.676 m (5' 6\")   Wt 85.7 kg (189 lb)   SpO2 92%   BMI 30.51 kg/m²   General Appearance: Very pleasant michael male, alert, no distress, obese, well-groomed  Skin: Skin color, texture, turgor normal. No rashes or lesions.  Lungs: negative findings: normal respiratory rate and rhythm, lungs clear to auscultation  Heart: negative. RRR without murmur, gallop, or rubs.  No ectopy.  Abdomen: Abdomen soft, non-tender. BS normal. No masses,  No organomegaly  Musculoskeletal: negative findings: no evidence of joint instability, no evidence of muscle atrophy, no deformities present  Neurologic: intact, CN II through XII grossly intact    Medical decision making/discussion: Patient agrees to influenza and Prevnar today, he was given written prescription for Shingrix.  Other medications refilled, he is to take them as prescribed.  He is to have labs done anytime in the next week or so, he will be notified of results and further actions if needed.  He is otherwise follow-up with me in 6 months, sooner if needed.  He is to continue care per specialists including urology.  Discussed the importance of lifestyle modifications including healthy diet, regular exercise and efforts towards weight loss.    Celio was seen today for medication refill and cough.    Diagnoses and all orders for this visit:    Essential hypertension  -     Comp Metabolic Panel; Future  -     CBC WITH DIFFERENTIAL; Future  -     Lipid Profile; Future  -     lisinopril (PRINIVIL) 40 MG tablet; TAKE 1 TABLET BY MOUTH DAILY.    Benign prostatic hyperplasia without lower urinary tract symptoms  -     terazosin (HYTRIN) 5 MG Cap; Take 1 Cap by mouth every day.  -     URINALYSIS,CULTURE IF INDICATED; Future    Recurrent major depressive disorder, in partial " remission (HCC)  -     sertraline (ZOLOFT) 100 MG Tab; Take 2 Tabs by mouth every day.    Vitamin D insufficiency  -     VITAMIN D,25 HYDROXY; Future    Anemia, unspecified type  -     CBC WITH DIFFERENTIAL; Future    Cough    Need for vaccination  -     Influenza Vaccine, High Dose (65+ Only)  -     Prevnar 13 PCV-13  -     Zoster Vac Recomb Adjuvanted (SHINGRIX) 50 MCG/0.5ML Recon Susp; 0.5 mL by Intramuscular route Once for 1 dose.    Obesity (BMI 30-39.9)  -     Patient identified as having weight management issue.  Appropriate orders and counseling given.        Return in about 6 months (around 5/5/2020) for Follow-up, Discuss Labs.        Please note that this dictation was created using voice recognition software. I have made every reasonable attempt to correct obvious errors, but I expect that there are errors of grammar and possibly content that I did not discover before finalizing the note.

## 2019-11-05 NOTE — PATIENT INSTRUCTIONS
Flu and Prevnar    meds refilled    Labs anytime soon, they are fasting    See me in 6 months, sooner if needed    Written Rx for Shingrix    Try allergy meds for drainage, such as Allegra, Zyrtec or Xyzal (store brand is ok)

## 2019-11-06 PROBLEM — E66.9 OBESITY (BMI 30-39.9): Status: ACTIVE | Noted: 2019-11-06

## 2019-11-06 NOTE — ASSESSMENT & PLAN NOTE
Chronic and stable, well controlled on sertraline 200 mg daily, does need refills, these were called in for him.  Tolerates this well with no significant or bothersome side effects, denies suicidal and homicidal ideations, hallucinations, racing thoughts and flights of ideas.  
Is a chronic and stable condition, well-controlled on lisinopril 40 mg daily, blood pressure today within normal limits, he does need refills on lisinopril, this is called in for him.  He is due for labs, these have been ordered.  He is going to follow-up with me in 6 months, sooner if needed.  
Patient does have chronic cough associated with his seasonal allergies, this is occasionally productive of clear phlegm, he does note nasal drainage as well as postnasal drip.  He is not currently taking second or third generation antihistamine, is advised to start 1 of these as they are available over-the-counter.  He was advised to take this consistently over the next 60 to 90 days.  He is to follow-up with me if his symptoms do not improve or if he develops any other associated systemic symptoms.  
Patient does have history of vitamin D deficiency, not currently taking over-the-counter vitamin D supplement, due for labs.  
Patient does have past history of iron deficiency anemia, he is past due for labs, these have been ordered.  Not currently taking iron supplement.  He does not complain of symptoms associated with anemia.  
Patient weight is 189 pounds, BMI 30.51.  He continues to work on this, we discussed lifestyle modifications.  
This is a chronic condition, stable, he is on terazosin 5 mg daily, he is closely followed by urology who also monitors annual PSA.  He does need refills, this is called in for him.  
abdominal pain, nausea, vomiting, abdominal cramp

## 2020-04-30 DIAGNOSIS — I10 ESSENTIAL HYPERTENSION: ICD-10-CM

## 2020-04-30 NOTE — TELEPHONE ENCOUNTER
Was the patient seen in the last year in this department? Yes    Does patient have an active prescription for medications requested? No     Received Request Via: Pharmacy    Pt met protocol?: Yes     Last OV 11/05/2019    BP Readings from Last 1 Encounters:   11/05/19 122/80

## 2020-05-01 RX ORDER — LISINOPRIL 40 MG/1
TABLET ORAL
Qty: 90 TAB | Refills: 1 | Status: SHIPPED | OUTPATIENT
Start: 2020-05-01 | End: 2021-07-19

## 2020-05-13 ENCOUNTER — HOSPITAL ENCOUNTER (OUTPATIENT)
Dept: LAB | Facility: MEDICAL CENTER | Age: 75
End: 2020-05-13
Attending: NURSE PRACTITIONER
Payer: COMMERCIAL

## 2020-05-13 DIAGNOSIS — N40.0 BENIGN PROSTATIC HYPERPLASIA WITHOUT LOWER URINARY TRACT SYMPTOMS: ICD-10-CM

## 2020-05-13 DIAGNOSIS — D64.9 ANEMIA, UNSPECIFIED TYPE: ICD-10-CM

## 2020-05-13 DIAGNOSIS — I10 ESSENTIAL HYPERTENSION: ICD-10-CM

## 2020-05-13 DIAGNOSIS — E55.9 VITAMIN D INSUFFICIENCY: ICD-10-CM

## 2020-05-13 LAB
25(OH)D3 SERPL-MCNC: 33 NG/ML (ref 30–100)
ALBUMIN SERPL BCP-MCNC: 4.8 G/DL (ref 3.2–4.9)
ALBUMIN/GLOB SERPL: 1.8 G/DL
ALP SERPL-CCNC: 115 U/L (ref 30–99)
ALT SERPL-CCNC: 20 U/L (ref 2–50)
ANION GAP SERPL CALC-SCNC: 12 MMOL/L (ref 7–16)
APPEARANCE UR: CLEAR
AST SERPL-CCNC: 21 U/L (ref 12–45)
BASOPHILS # BLD AUTO: 0.8 % (ref 0–1.8)
BASOPHILS # BLD: 0.04 K/UL (ref 0–0.12)
BILIRUB SERPL-MCNC: 0.9 MG/DL (ref 0.1–1.5)
BILIRUB UR QL STRIP.AUTO: NEGATIVE
BUN SERPL-MCNC: 19 MG/DL (ref 8–22)
CALCIUM SERPL-MCNC: 10.7 MG/DL (ref 8.5–10.5)
CHLORIDE SERPL-SCNC: 107 MMOL/L (ref 96–112)
CHOLEST SERPL-MCNC: 139 MG/DL (ref 100–199)
CO2 SERPL-SCNC: 23 MMOL/L (ref 20–33)
COLOR UR: YELLOW
CREAT SERPL-MCNC: 0.94 MG/DL (ref 0.5–1.4)
EOSINOPHIL # BLD AUTO: 0.32 K/UL (ref 0–0.51)
EOSINOPHIL NFR BLD: 6.4 % (ref 0–6.9)
ERYTHROCYTE [DISTWIDTH] IN BLOOD BY AUTOMATED COUNT: 48.4 FL (ref 35.9–50)
FASTING STATUS PATIENT QL REPORTED: NORMAL
GLOBULIN SER CALC-MCNC: 2.6 G/DL (ref 1.9–3.5)
GLUCOSE SERPL-MCNC: 98 MG/DL (ref 65–99)
GLUCOSE UR STRIP.AUTO-MCNC: NEGATIVE MG/DL
HCT VFR BLD AUTO: 43 % (ref 42–52)
HDLC SERPL-MCNC: 39 MG/DL
HGB BLD-MCNC: 14.5 G/DL (ref 14–18)
IMM GRANULOCYTES # BLD AUTO: 0.01 K/UL (ref 0–0.11)
IMM GRANULOCYTES NFR BLD AUTO: 0.2 % (ref 0–0.9)
KETONES UR STRIP.AUTO-MCNC: NEGATIVE MG/DL
LDLC SERPL CALC-MCNC: 87 MG/DL
LEUKOCYTE ESTERASE UR QL STRIP.AUTO: NEGATIVE
LYMPHOCYTES # BLD AUTO: 1.36 K/UL (ref 1–4.8)
LYMPHOCYTES NFR BLD: 27 % (ref 22–41)
MCH RBC QN AUTO: 31.5 PG (ref 27–33)
MCHC RBC AUTO-ENTMCNC: 33.7 G/DL (ref 33.7–35.3)
MCV RBC AUTO: 93.5 FL (ref 81.4–97.8)
MICRO URNS: NORMAL
MONOCYTES # BLD AUTO: 0.39 K/UL (ref 0–0.85)
MONOCYTES NFR BLD AUTO: 7.8 % (ref 0–13.4)
NEUTROPHILS # BLD AUTO: 2.91 K/UL (ref 1.82–7.42)
NEUTROPHILS NFR BLD: 57.8 % (ref 44–72)
NITRITE UR QL STRIP.AUTO: NEGATIVE
NRBC # BLD AUTO: 0 K/UL
NRBC BLD-RTO: 0 /100 WBC
PH UR STRIP.AUTO: 5.5 [PH] (ref 5–8)
PLATELET # BLD AUTO: 141 K/UL (ref 164–446)
PMV BLD AUTO: 10.2 FL (ref 9–12.9)
POTASSIUM SERPL-SCNC: 4.6 MMOL/L (ref 3.6–5.5)
PROT SERPL-MCNC: 7.4 G/DL (ref 6–8.2)
PROT UR QL STRIP: NEGATIVE MG/DL
RBC # BLD AUTO: 4.6 M/UL (ref 4.7–6.1)
RBC UR QL AUTO: NEGATIVE
SODIUM SERPL-SCNC: 142 MMOL/L (ref 135–145)
SP GR UR STRIP.AUTO: 1.02
TRIGL SERPL-MCNC: 65 MG/DL (ref 0–149)
UROBILINOGEN UR STRIP.AUTO-MCNC: 0.2 MG/DL
WBC # BLD AUTO: 5 K/UL (ref 4.8–10.8)

## 2020-05-13 PROCEDURE — 85025 COMPLETE CBC W/AUTO DIFF WBC: CPT

## 2020-05-13 PROCEDURE — 36415 COLL VENOUS BLD VENIPUNCTURE: CPT

## 2020-05-13 PROCEDURE — 81003 URINALYSIS AUTO W/O SCOPE: CPT

## 2020-05-13 PROCEDURE — 80053 COMPREHEN METABOLIC PANEL: CPT

## 2020-05-13 PROCEDURE — 80061 LIPID PANEL: CPT

## 2020-05-13 PROCEDURE — 82306 VITAMIN D 25 HYDROXY: CPT

## 2020-05-18 ENCOUNTER — OFFICE VISIT (OUTPATIENT)
Dept: MEDICAL GROUP | Facility: PHYSICIAN GROUP | Age: 75
End: 2020-05-18
Payer: COMMERCIAL

## 2020-05-18 VITALS
OXYGEN SATURATION: 93 % | HEART RATE: 83 BPM | DIASTOLIC BLOOD PRESSURE: 80 MMHG | BODY MASS INDEX: 29.73 KG/M2 | RESPIRATION RATE: 18 BRPM | WEIGHT: 185 LBS | HEIGHT: 66 IN | SYSTOLIC BLOOD PRESSURE: 118 MMHG | TEMPERATURE: 98 F

## 2020-05-18 DIAGNOSIS — E83.52 HYPERCALCEMIA: ICD-10-CM

## 2020-05-18 DIAGNOSIS — I10 ESSENTIAL HYPERTENSION: ICD-10-CM

## 2020-05-18 DIAGNOSIS — E21.3 HYPERPARATHYROIDISM (HCC): ICD-10-CM

## 2020-05-18 DIAGNOSIS — J30.2 SEASONAL ALLERGIES: ICD-10-CM

## 2020-05-18 DIAGNOSIS — E55.9 VITAMIN D INSUFFICIENCY: ICD-10-CM

## 2020-05-18 PROCEDURE — 99214 OFFICE O/P EST MOD 30 MIN: CPT | Performed by: NURSE PRACTITIONER

## 2020-05-18 RX ORDER — FLUTICASONE PROPIONATE 50 MCG
SPRAY, SUSPENSION (ML) NASAL
COMMUNITY
End: 2020-05-18

## 2020-05-18 RX ORDER — CETIRIZINE HYDROCHLORIDE 10 MG/1
10 TABLET ORAL DAILY
Qty: 90 TAB | Refills: 3 | Status: SHIPPED | OUTPATIENT
Start: 2020-05-18 | End: 2021-07-19

## 2020-05-18 ASSESSMENT — FIBROSIS 4 INDEX: FIB4 SCORE: 2.53

## 2020-05-18 NOTE — ASSESSMENT & PLAN NOTE
Patient has been suffering from what sounds like a seasonal allergies, he has had a chronic cough, nasal congestion, itchy eyes, runny nose.  He does deny any other associated symptoms including shortness of breath, fever or body aches.  He does deny sinus pressure and pain.  He is not currently taking medication for seasonal allergies, he will start Zyrtec 10 mg daily, is also advised to consider taking over-the-counter Flonase or Nasonex.

## 2020-05-18 NOTE — ASSESSMENT & PLAN NOTE
Patient does have history of hypercalcemia, he does have a history of hyperparathyroidism related to parathyroid adenoma and has since had this removed.  Calcium is mildly elevated at 10.7, will repeat labs again in 1 month.  Not currently taking a calcium supplement.  He is asymptomatic with this.

## 2020-05-18 NOTE — ASSESSMENT & PLAN NOTE
Patient has history of vitamin D deficiency, currently taking a multivitamin, his most recent vitamin D level was well within normal limits.

## 2020-05-18 NOTE — ASSESSMENT & PLAN NOTE
Chronic, stable, well controlled on current medications including lisinopril 40 mg daily, blood pressure today well within normal limits and denies symptoms of hypertension.  He is up-to-date with labs, does not need refills on his medications at this time.

## 2020-05-18 NOTE — PROGRESS NOTES
Chief Complaint   Patient presents with   • Cough     x 2 months    • Hypertension         This is a 75 y.o.male patient that presents today with the following:Follow-up, discuss acute chronic conditions, review labs    HTN (hypertension)  Chronic, stable, well controlled on current medications including lisinopril 40 mg daily, blood pressure today well within normal limits and denies symptoms of hypertension.  He is up-to-date with labs, does not need refills on his medications at this time.    Hypercalcemia  Patient does have history of hypercalcemia, he does have a history of hyperparathyroidism related to parathyroid adenoma and has since had this removed.  Calcium is mildly elevated at 10.7, will repeat labs again in 1 month.  Not currently taking a calcium supplement.  He is asymptomatic with this.    Hyperparathyroidism  See additional notes    Seasonal allergies  Patient has been suffering from what sounds like a seasonal allergies, he has had a chronic cough, nasal congestion, itchy eyes, runny nose.  He does deny any other associated symptoms including shortness of breath, fever or body aches.  He does deny sinus pressure and pain.  He is not currently taking medication for seasonal allergies, he will start Zyrtec 10 mg daily, is also advised to consider taking over-the-counter Flonase or Nasonex.    Vitamin D insufficiency  Patient has history of vitamin D deficiency, currently taking a multivitamin, his most recent vitamin D level was well within normal limits.      Hospital Outpatient Visit on 05/13/2020   Component Date Value   • Color 05/13/2020 Yellow    • Character 05/13/2020 Clear    • Specific Gravity 05/13/2020 1.024    • Ph 05/13/2020 5.5    • Glucose 05/13/2020 Negative    • Ketones 05/13/2020 Negative    • Protein 05/13/2020 Negative    • Bilirubin 05/13/2020 Negative    • Urobilinogen, Urine 05/13/2020 0.2    • Nitrite 05/13/2020 Negative    • Leukocyte Esterase 05/13/2020 Negative    •  Occult Blood 05/13/2020 Negative    • Micro Urine Req 05/13/2020 see below    • 25-Hydroxy   Vitamin D 25 05/13/2020 33    • Cholesterol,Tot 05/13/2020 139    • Triglycerides 05/13/2020 65    • HDL 05/13/2020 39*   • LDL 05/13/2020 87    • WBC 05/13/2020 5.0    • RBC 05/13/2020 4.60*   • Hemoglobin 05/13/2020 14.5    • Hematocrit 05/13/2020 43.0    • MCV 05/13/2020 93.5    • MCH 05/13/2020 31.5    • MCHC 05/13/2020 33.7    • RDW 05/13/2020 48.4    • Platelet Count 05/13/2020 141*   • MPV 05/13/2020 10.2    • Neutrophils-Polys 05/13/2020 57.80    • Lymphocytes 05/13/2020 27.00    • Monocytes 05/13/2020 7.80    • Eosinophils 05/13/2020 6.40    • Basophils 05/13/2020 0.80    • Immature Granulocytes 05/13/2020 0.20    • Nucleated RBC 05/13/2020 0.00    • Neutrophils (Absolute) 05/13/2020 2.91    • Lymphs (Absolute) 05/13/2020 1.36    • Monos (Absolute) 05/13/2020 0.39    • Eos (Absolute) 05/13/2020 0.32    • Baso (Absolute) 05/13/2020 0.04    • Immature Granulocytes (a* 05/13/2020 0.01    • NRBC (Absolute) 05/13/2020 0.00    • Sodium 05/13/2020 142    • Potassium 05/13/2020 4.6    • Chloride 05/13/2020 107    • Co2 05/13/2020 23    • Anion Gap 05/13/2020 12.0    • Glucose 05/13/2020 98    • Bun 05/13/2020 19    • Creatinine 05/13/2020 0.94    • Calcium 05/13/2020 10.7*   • AST(SGOT) 05/13/2020 21    • ALT(SGPT) 05/13/2020 20    • Alkaline Phosphatase 05/13/2020 115*   • Total Bilirubin 05/13/2020 0.9    • Albumin 05/13/2020 4.8    • Total Protein 05/13/2020 7.4    • Globulin 05/13/2020 2.6    • A-G Ratio 05/13/2020 1.8    • Fasting Status 05/13/2020 Fasting    • GFR If  05/13/2020 >60    • GFR If Non  Ameri* 05/13/2020 >60          clinical course has been stable    Past Medical History:   Diagnosis Date   • Allergic rhinitis, cause unspecified    • Anxiety state, unspecified    • Degeneration of lumbar or lumbosacral intervertebral disc    • Depressive disorder, not elsewhere classified    •  "Esophageal reflux    • Essential hypertension, malignant    • Generalized anxiety disorder 11/10/2009   • History of kidney stones    • Hypertrophy of prostate with urinary obstruction and other lower urinary tract symptoms (LUTS)    • Ingrowing nail     RT GREAT TOE   • Insomnia, unspecified    • Right bundle branch block and left anterior fascicular block    • SOB (shortness of breath) 10/24/2014   • Unspecified deficiency anemia    • Unspecified hemorrhagic conditions     nose bleeds       Past Surgical History:   Procedure Laterality Date   • PB OPEN FIX INTER/SUBTROCH FX,IMPLNT Right 6/7/2019    Procedure: INSERTION, INTRAMEDULLARY LOULOU, FEMUR, PROXIMAL;  Surgeon: Kendrick Sheldon M.D.;  Location: SURGERY [facility];  Service: Orthopedics   • PARATHYROID EXPLORATION  7/25/2012    Performed by GURVINDER KENDALL at SURGERY SAME DAY [facility] ORS   • LUMBAR LAMINECTOMY DISKECTOMY  2009   • CERVICAL DISK AND FUSION ANTERIOR  2009   • INGUINAL HERNIA REPAIR      left   • LITHOTRIPSY      \"several years ago\"       Family History   Problem Relation Age of Onset   • Cancer Mother         LIVER   • Hypertension Mother    • No Known Problems Father    • No Known Problems Sister    • No Known Problems Brother        Xanax [alprazolam]    Current Outpatient Medications Ordered in Epic   Medication Sig Dispense Refill   • cetirizine (ZYRTEC) 10 MG Tab Take 1 Tab by mouth every day. 90 Tab 3   • lisinopril (PRINIVIL) 40 MG tablet TAKE ONE TABLET BY MOUTH EVERY DAY 90 Tab 1   • sertraline (ZOLOFT) 100 MG Tab Take 2 Tabs by mouth every day. 180 Tab 3   • terazosin (HYTRIN) 5 MG Cap Take 1 Cap by mouth every day. 90 Cap 3   • Multiple Vitamins-Minerals (CENTRUM SILVER ADULT 50+ PO) Take 1 Tab by mouth every day.       No current [facility]-ordered facility-administered medications on file.        Constitutional ROS: No unexpected change in weight, No weakness, No unexplained fevers, sweats, or chills  Pulmonary ROS: Positive per " "HPI  Cardiovascular ROS: No chest pain, No edema, No palpitations, Positive for hypertension And hyperlipidemia  Gastrointestinal ROS: No abdominal pain, No nausea, vomiting, diarrhea, or constipation  Musculoskeletal/Extremities ROS: No clubbing, No peripheral edema, No pain, redness or swelling on the joints  Neurologic ROS: Normal development, No seizures, No weakness  Endocrine ROS: Positive per HPI    Physical exam:  /80   Pulse 83   Temp 36.7 °C (98 °F) (Temporal)   Resp 18   Ht 1.676 m (5' 6\")   Wt 83.9 kg (185 lb)   SpO2 93%   BMI 29.86 kg/m²   General Appearance: Pleasant elderly male,alert, no distress, Moderately overweight, well-groomed  Skin: Skin color, texture, turgor normal. No rashes or lesions.  Lungs: negative findings: normal respiratory rate and rhythm, Clear to auscultation throughout  Heart: negative. RRR without murmur, gallop, or rubs.  No ectopy.  Abdomen: Abdomen soft, non-tender. BS normal. No masses,  No organomegaly  Musculoskeletal: negative findings: no evidence of joint instability, no evidence of muscle atrophy, no deformities present  Neurologic: intact, CN II through XII grossly intact    Medical decision making/discussion:     Start zyrtec, 10 mg daily    Repeat lab in 1 months    Follow up in office in 3 months    Celio was seen today for cough and hypertension.    Diagnoses and all orders for this visit:    Hypercalcemia  -     Comp Metabolic Panel; Future    Seasonal allergies  -     cetirizine (ZYRTEC) 10 MG Tab; Take 1 Tab by mouth every day.    Hyperparathyroidism (HCC)    Essential hypertension    Vitamin D insufficiency        No follow-ups on file.        Please note that this dictation was created using voice recognition software. I have made every reasonable attempt to correct obvious errors, but I expect that there are errors of grammar and possibly content that I did not discover before finalizing the note.        "

## 2020-08-24 ENCOUNTER — OFFICE VISIT (OUTPATIENT)
Dept: MEDICAL GROUP | Facility: PHYSICIAN GROUP | Age: 75
End: 2020-08-24
Payer: COMMERCIAL

## 2020-08-24 ENCOUNTER — HOSPITAL ENCOUNTER (OUTPATIENT)
Dept: LAB | Facility: MEDICAL CENTER | Age: 75
End: 2020-08-24
Attending: NURSE PRACTITIONER
Payer: COMMERCIAL

## 2020-08-24 VITALS
TEMPERATURE: 97.8 F | RESPIRATION RATE: 16 BRPM | HEIGHT: 66 IN | HEART RATE: 70 BPM | WEIGHT: 186 LBS | SYSTOLIC BLOOD PRESSURE: 110 MMHG | BODY MASS INDEX: 29.89 KG/M2 | OXYGEN SATURATION: 95 % | DIASTOLIC BLOOD PRESSURE: 72 MMHG

## 2020-08-24 DIAGNOSIS — G89.29 CHRONIC PAIN OF BOTH KNEES: ICD-10-CM

## 2020-08-24 DIAGNOSIS — E55.9 VITAMIN D INSUFFICIENCY: ICD-10-CM

## 2020-08-24 DIAGNOSIS — E83.52 HYPERCALCEMIA: ICD-10-CM

## 2020-08-24 DIAGNOSIS — E21.3 HYPERPARATHYROIDISM (HCC): ICD-10-CM

## 2020-08-24 DIAGNOSIS — I10 ESSENTIAL HYPERTENSION: ICD-10-CM

## 2020-08-24 DIAGNOSIS — M25.562 CHRONIC PAIN OF BOTH KNEES: ICD-10-CM

## 2020-08-24 DIAGNOSIS — M25.561 CHRONIC PAIN OF BOTH KNEES: ICD-10-CM

## 2020-08-24 LAB
ALBUMIN SERPL BCP-MCNC: 4.5 G/DL (ref 3.2–4.9)
ALBUMIN/GLOB SERPL: 1.8 G/DL
ALP SERPL-CCNC: 116 U/L (ref 30–99)
ALT SERPL-CCNC: 18 U/L (ref 2–50)
ANION GAP SERPL CALC-SCNC: 10 MMOL/L (ref 7–16)
AST SERPL-CCNC: 16 U/L (ref 12–45)
BILIRUB SERPL-MCNC: 0.4 MG/DL (ref 0.1–1.5)
BUN SERPL-MCNC: 17 MG/DL (ref 8–22)
CALCIUM SERPL-MCNC: 10.4 MG/DL (ref 8.5–10.5)
CHLORIDE SERPL-SCNC: 106 MMOL/L (ref 96–112)
CO2 SERPL-SCNC: 23 MMOL/L (ref 20–33)
CREAT SERPL-MCNC: 0.94 MG/DL (ref 0.5–1.4)
GLOBULIN SER CALC-MCNC: 2.5 G/DL (ref 1.9–3.5)
GLUCOSE SERPL-MCNC: 101 MG/DL (ref 65–99)
POTASSIUM SERPL-SCNC: 4.7 MMOL/L (ref 3.6–5.5)
PROT SERPL-MCNC: 7 G/DL (ref 6–8.2)
SODIUM SERPL-SCNC: 139 MMOL/L (ref 135–145)

## 2020-08-24 PROCEDURE — 36415 COLL VENOUS BLD VENIPUNCTURE: CPT

## 2020-08-24 PROCEDURE — 80053 COMPREHEN METABOLIC PANEL: CPT

## 2020-08-24 PROCEDURE — 99214 OFFICE O/P EST MOD 30 MIN: CPT | Performed by: NURSE PRACTITIONER

## 2020-08-24 ASSESSMENT — FIBROSIS 4 INDEX: FIB4 SCORE: 2.53

## 2020-08-24 NOTE — PATIENT INSTRUCTIONS
Lab today    Try topical anti-inflammatory such as Voltaren, can get OTC now    Otherwise, follow up in 6 months with labs before visit

## 2020-08-24 NOTE — PROGRESS NOTES
Chief Complaint   Patient presents with   • Hypertension     didn't do labs         This is a 75 y.o.male patient that presents today with the following: Hypertension, knee pain    Hypercalcemia  Patient was to have labs done before today's visit to reevaluate calcium level as it was mildly elevated with his last lab draw  He does have a history of hyperparathyroidism, not taking a calcium supplement other than what is in his Centrum Silver adult  Patient agrees to have labs done today    Vitamin D insufficiency  Patient has history of vitamin D deficiency, due for labs  Not currently taking vitamin D supplement other than what is in his daily multivitamin    Chronic pain of both knees  Patient does have chronic pain in both knees, right greater than left which she believes is likely arthritis  He does have full range of motion, is weightbearing without difficulty but finds that his knees ache at night  He does decline x-ray today and he will try some topical analgesics such as now over-the-counter Voltaren, lidocaine gel and other topical preparations  He does understand that he can follow-up for further evaluation including imaging should he not get relief with topical medications    HTN (hypertension)  Chronic and stable  Well-controlled on current medications including lisinopril 40 mg daily  Blood pressure today is well within normal limits and denies symptoms of hypertension  Will be due for labs before his next appointment, these have been ordered      No visits with results within 1 Month(s) from this visit.   Latest known visit with results is:   Hospital Outpatient Visit on 05/13/2020   Component Date Value   • Color 05/13/2020 Yellow    • Character 05/13/2020 Clear    • Specific Gravity 05/13/2020 1.024    • Ph 05/13/2020 5.5    • Glucose 05/13/2020 Negative    • Ketones 05/13/2020 Negative    • Protein 05/13/2020 Negative    • Bilirubin 05/13/2020 Negative    • Urobilinogen, Urine 05/13/2020 0.2    •  Nitrite 05/13/2020 Negative    • Leukocyte Esterase 05/13/2020 Negative    • Occult Blood 05/13/2020 Negative    • Micro Urine Req 05/13/2020 see below    • 25-Hydroxy   Vitamin D 25 05/13/2020 33    • Cholesterol,Tot 05/13/2020 139    • Triglycerides 05/13/2020 65    • HDL 05/13/2020 39*   • LDL 05/13/2020 87    • WBC 05/13/2020 5.0    • RBC 05/13/2020 4.60*   • Hemoglobin 05/13/2020 14.5    • Hematocrit 05/13/2020 43.0    • MCV 05/13/2020 93.5    • MCH 05/13/2020 31.5    • MCHC 05/13/2020 33.7    • RDW 05/13/2020 48.4    • Platelet Count 05/13/2020 141*   • MPV 05/13/2020 10.2    • Neutrophils-Polys 05/13/2020 57.80    • Lymphocytes 05/13/2020 27.00    • Monocytes 05/13/2020 7.80    • Eosinophils 05/13/2020 6.40    • Basophils 05/13/2020 0.80    • Immature Granulocytes 05/13/2020 0.20    • Nucleated RBC 05/13/2020 0.00    • Neutrophils (Absolute) 05/13/2020 2.91    • Lymphs (Absolute) 05/13/2020 1.36    • Monos (Absolute) 05/13/2020 0.39    • Eos (Absolute) 05/13/2020 0.32    • Baso (Absolute) 05/13/2020 0.04    • Immature Granulocytes (a* 05/13/2020 0.01    • NRBC (Absolute) 05/13/2020 0.00    • Sodium 05/13/2020 142    • Potassium 05/13/2020 4.6    • Chloride 05/13/2020 107    • Co2 05/13/2020 23    • Anion Gap 05/13/2020 12.0    • Glucose 05/13/2020 98    • Bun 05/13/2020 19    • Creatinine 05/13/2020 0.94    • Calcium 05/13/2020 10.7*   • AST(SGOT) 05/13/2020 21    • ALT(SGPT) 05/13/2020 20    • Alkaline Phosphatase 05/13/2020 115*   • Total Bilirubin 05/13/2020 0.9    • Albumin 05/13/2020 4.8    • Total Protein 05/13/2020 7.4    • Globulin 05/13/2020 2.6    • A-G Ratio 05/13/2020 1.8    • Fasting Status 05/13/2020 Fasting    • GFR If  05/13/2020 >60    • GFR If Non  Ameri* 05/13/2020 >60          clinical course has been stable    Past Medical History:   Diagnosis Date   • Allergic rhinitis, cause unspecified    • Anxiety state, unspecified    • Degeneration of lumbar or lumbosacral  "intervertebral disc    • Depressive disorder, not elsewhere classified    • Esophageal reflux    • Essential hypertension, malignant    • Generalized anxiety disorder 11/10/2009   • History of kidney stones    • Hypertrophy of prostate with urinary obstruction and other lower urinary tract symptoms (LUTS)    • Ingrowing nail     RT GREAT TOE   • Insomnia, unspecified    • Right bundle branch block and left anterior fascicular block    • SOB (shortness of breath) 10/24/2014   • Unspecified deficiency anemia    • Unspecified hemorrhagic conditions     nose bleeds       Past Surgical History:   Procedure Laterality Date   • PB OPEN FIX INTER/SUBTROCH FX,IMPLNT Right 6/7/2019    Procedure: INSERTION, INTRAMEDULLARY LOULOU, FEMUR, PROXIMAL;  Surgeon: Kendrick Sheldon M.D.;  Location: SURGERY [facility];  Service: Orthopedics   • PARATHYROID EXPLORATION  7/25/2012    Performed by GURVINDER KENDALL at SURGERY SAME DAY [facility] ORS   • LUMBAR LAMINECTOMY DISKECTOMY  2009   • CERVICAL DISK AND FUSION ANTERIOR  2009   • INGUINAL HERNIA REPAIR      left   • LITHOTRIPSY      \"several years ago\"       Family History   Problem Relation Age of Onset   • Cancer Mother         LIVER   • Hypertension Mother    • No Known Problems Father    • No Known Problems Sister    • No Known Problems Brother        Xanax [alprazolam]    Current Outpatient Medications Ordered in Epic   Medication Sig Dispense Refill   • cetirizine (ZYRTEC) 10 MG Tab Take 1 Tab by mouth every day. 90 Tab 3   • lisinopril (PRINIVIL) 40 MG tablet TAKE ONE TABLET BY MOUTH EVERY DAY 90 Tab 1   • sertraline (ZOLOFT) 100 MG Tab Take 2 Tabs by mouth every day. 180 Tab 3   • terazosin (HYTRIN) 5 MG Cap Take 1 Cap by mouth every day. 90 Cap 3   • Multiple Vitamins-Minerals (CENTRUM SILVER ADULT 50+ PO) Take 1 Tab by mouth every day.       No current [facility]-ordered facility-administered medications on file.        Constitutional ROS: No unexpected change in weight, No " "weakness, No unexplained fevers, sweats, or chills  Pulmonary ROS: No chronic cough, sputum, or hemoptysis, No shortness of breath, No recent change in breathing  Cardiovascular ROS: No chest pain, No edema, No palpitations, Positive for hypertension   Gastrointestinal ROS: No abdominal pain, No nausea, vomiting, diarrhea, or constipation  Musculoskeletal/Extremities ROS: Positive per HPI  Neurologic ROS: Normal development, No seizures, No weakness    Physical exam:  /72   Pulse 70   Temp 36.6 °C (97.8 °F) (Temporal)   Resp 16   Ht 1.676 m (5' 6\")   Wt 84.4 kg (186 lb)   SpO2 95%   BMI 30.02 kg/m²   General Appearance: Pleasant elderly male, alert, no distress, well-groomed  Skin: Skin color, texture, turgor normal. No rashes or lesions.  Lungs: negative findings: normal respiratory rate and rhythm, lungs clear to auscultation  Heart: negative. RRR without murmur, gallop, or rubs.  No ectopy.  Abdomen: Abdomen soft, non-tender. BS normal. No masses,  No organomegaly  Musculoskeletal: negative findings: no evidence of joint instability, no evidence of muscle atrophy, no deformities present  Neurologic: intact, CN II through XII grossly intact    Medical decision making/discussion:     Lab today    Try topical anti-inflammatory such as Voltaren, can get OTC now    Otherwise, follow up in 6 months with labs before visit    Celio was seen today for hypertension.    Diagnoses and all orders for this visit:    Essential hypertension  -     CBC WITH DIFFERENTIAL; Future  -     Comp Metabolic Panel; Future  -     Lipid Profile; Future    Hyperparathyroidism (HCC)    Hypercalcemia    Vitamin D insufficiency  -     VITAMIN D,25 HYDROXY; Future    Chronic pain of both knees        Return in about 6 months (around 2/24/2021) for Discuss Labs, Follow-up.        Please note that this dictation was created using voice recognition software. I have made every reasonable attempt to correct obvious errors, but I expect " that there are errors of grammar and possibly content that I did not discover before finalizing the note.

## 2020-08-25 NOTE — ASSESSMENT & PLAN NOTE
Patient has history of vitamin D deficiency, due for labs  Not currently taking vitamin D supplement other than what is in his daily multivitamin

## 2020-08-25 NOTE — ASSESSMENT & PLAN NOTE
Chronic and stable  Well-controlled on current medications including lisinopril 40 mg daily  Blood pressure today is well within normal limits and denies symptoms of hypertension  Will be due for labs before his next appointment, these have been ordered

## 2020-08-25 NOTE — ASSESSMENT & PLAN NOTE
Patient does have chronic pain in both knees, right greater than left which she believes is likely arthritis  He does have full range of motion, is weightbearing without difficulty but finds that his knees ache at night  He does decline x-ray today and he will try some topical analgesics such as now over-the-counter Voltaren, lidocaine gel and other topical preparations  He does understand that he can follow-up for further evaluation including imaging should he not get relief with topical medications

## 2020-08-25 NOTE — ASSESSMENT & PLAN NOTE
Patient was to have labs done before today's visit to reevaluate calcium level as it was mildly elevated with his last lab draw  He does have a history of hyperparathyroidism, not taking a calcium supplement other than what is in his Centrum Silver adult  Patient agrees to have labs done today

## 2020-10-21 ENCOUNTER — TELEPHONE (OUTPATIENT)
Dept: MEDICAL GROUP | Facility: PHYSICIAN GROUP | Age: 75
End: 2020-10-21

## 2020-12-30 ENCOUNTER — OFFICE VISIT (OUTPATIENT)
Dept: MEDICAL GROUP | Facility: PHYSICIAN GROUP | Age: 75
End: 2020-12-30
Payer: COMMERCIAL

## 2020-12-30 ENCOUNTER — HOSPITAL ENCOUNTER (OUTPATIENT)
Dept: LAB | Facility: MEDICAL CENTER | Age: 75
End: 2020-12-30
Attending: NURSE PRACTITIONER
Payer: COMMERCIAL

## 2020-12-30 VITALS
SYSTOLIC BLOOD PRESSURE: 106 MMHG | RESPIRATION RATE: 22 BRPM | BODY MASS INDEX: 30.53 KG/M2 | TEMPERATURE: 96.9 F | WEIGHT: 190 LBS | HEIGHT: 66 IN | HEART RATE: 106 BPM | OXYGEN SATURATION: 95 % | DIASTOLIC BLOOD PRESSURE: 68 MMHG

## 2020-12-30 DIAGNOSIS — I10 ESSENTIAL HYPERTENSION: ICD-10-CM

## 2020-12-30 DIAGNOSIS — H93.8X3 SENSATION OF FULLNESS IN BOTH EARS: ICD-10-CM

## 2020-12-30 DIAGNOSIS — R00.2 PALPITATIONS: ICD-10-CM

## 2020-12-30 DIAGNOSIS — E55.9 VITAMIN D INSUFFICIENCY: ICD-10-CM

## 2020-12-30 LAB
25(OH)D3 SERPL-MCNC: 31 NG/ML (ref 30–100)
ALBUMIN SERPL BCP-MCNC: 4.6 G/DL (ref 3.2–4.9)
ALBUMIN/GLOB SERPL: 1.8 G/DL
ALP SERPL-CCNC: 135 U/L (ref 30–99)
ALT SERPL-CCNC: 23 U/L (ref 2–50)
ANION GAP SERPL CALC-SCNC: 8 MMOL/L (ref 7–16)
AST SERPL-CCNC: 23 U/L (ref 12–45)
BASOPHILS # BLD AUTO: 0.7 % (ref 0–1.8)
BASOPHILS # BLD: 0.04 K/UL (ref 0–0.12)
BILIRUB SERPL-MCNC: 0.4 MG/DL (ref 0.1–1.5)
BUN SERPL-MCNC: 23 MG/DL (ref 8–22)
CALCIUM SERPL-MCNC: 11.2 MG/DL (ref 8.5–10.5)
CHLORIDE SERPL-SCNC: 107 MMOL/L (ref 96–112)
CHOLEST SERPL-MCNC: 144 MG/DL (ref 100–199)
CO2 SERPL-SCNC: 26 MMOL/L (ref 20–33)
CREAT SERPL-MCNC: 1.24 MG/DL (ref 0.5–1.4)
EOSINOPHIL # BLD AUTO: 0.19 K/UL (ref 0–0.51)
EOSINOPHIL NFR BLD: 3.5 % (ref 0–6.9)
ERYTHROCYTE [DISTWIDTH] IN BLOOD BY AUTOMATED COUNT: 45.9 FL (ref 35.9–50)
FASTING STATUS PATIENT QL REPORTED: NORMAL
GLOBULIN SER CALC-MCNC: 2.5 G/DL (ref 1.9–3.5)
GLUCOSE SERPL-MCNC: 113 MG/DL (ref 65–99)
HCT VFR BLD AUTO: 45.5 % (ref 42–52)
HDLC SERPL-MCNC: 35 MG/DL
HGB BLD-MCNC: 14.7 G/DL (ref 14–18)
IMM GRANULOCYTES # BLD AUTO: 0.02 K/UL (ref 0–0.11)
IMM GRANULOCYTES NFR BLD AUTO: 0.4 % (ref 0–0.9)
LDLC SERPL CALC-MCNC: 86 MG/DL
LYMPHOCYTES # BLD AUTO: 1.46 K/UL (ref 1–4.8)
LYMPHOCYTES NFR BLD: 27 % (ref 22–41)
MCH RBC QN AUTO: 30.2 PG (ref 27–33)
MCHC RBC AUTO-ENTMCNC: 32.3 G/DL (ref 33.7–35.3)
MCV RBC AUTO: 93.4 FL (ref 81.4–97.8)
MONOCYTES # BLD AUTO: 0.38 K/UL (ref 0–0.85)
MONOCYTES NFR BLD AUTO: 7 % (ref 0–13.4)
NEUTROPHILS # BLD AUTO: 3.32 K/UL (ref 1.82–7.42)
NEUTROPHILS NFR BLD: 61.4 % (ref 44–72)
NRBC # BLD AUTO: 0 K/UL
NRBC BLD-RTO: 0 /100 WBC
PLATELET # BLD AUTO: 126 K/UL (ref 164–446)
PMV BLD AUTO: 10.6 FL (ref 9–12.9)
POTASSIUM SERPL-SCNC: 4.8 MMOL/L (ref 3.6–5.5)
PROT SERPL-MCNC: 7.1 G/DL (ref 6–8.2)
RBC # BLD AUTO: 4.87 M/UL (ref 4.7–6.1)
SODIUM SERPL-SCNC: 141 MMOL/L (ref 135–145)
TRIGL SERPL-MCNC: 113 MG/DL (ref 0–149)
WBC # BLD AUTO: 5.4 K/UL (ref 4.8–10.8)

## 2020-12-30 PROCEDURE — 80053 COMPREHEN METABOLIC PANEL: CPT

## 2020-12-30 PROCEDURE — 99214 OFFICE O/P EST MOD 30 MIN: CPT | Performed by: NURSE PRACTITIONER

## 2020-12-30 PROCEDURE — 80061 LIPID PANEL: CPT

## 2020-12-30 PROCEDURE — 36415 COLL VENOUS BLD VENIPUNCTURE: CPT

## 2020-12-30 PROCEDURE — 85025 COMPLETE CBC W/AUTO DIFF WBC: CPT

## 2020-12-30 PROCEDURE — 82306 VITAMIN D 25 HYDROXY: CPT

## 2020-12-30 RX ORDER — MULTIVIT WITH MINERALS/LUTEIN
TABLET ORAL
COMMUNITY

## 2020-12-30 ASSESSMENT — FIBROSIS 4 INDEX: FIB4 SCORE: 2.03

## 2020-12-30 NOTE — ASSESSMENT & PLAN NOTE
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

## 2020-12-30 NOTE — PROGRESS NOTES
Chief Complaint   Patient presents with   • Ear Fullness   • Palpitations         This is a 75 y.o.male patient that presents today with the following: palpitations, ear fullness    Palpitations  This started several months ago  Occurs at time, there is not associated CP, SOB, syncope or near syncope, or diaphoresis  Cannot associate this with anything--cannot discern a pattern with this  EKG in office is abnormal, will sent to all day reader in cardiology  EKG show RBBB, which does in fact have but it is also indication an old infarct  Does not follow with cardiology at this time  VSS except for mild tachycardia  ER precautions discussed  Will refer to cardiology and get ECHO, last one done several years ago  Was reminded to have labs done, as these were ordered at his last visit    Sensation of fullness in both ears  Pt notes to have bilateral ear fullness, denies pain, drainage or any other associated URI s/sx  Has had cerumen impaction in the past   Upon PE, bilateral EACs clear, TMs WNL, no signs of infection  Discussed likely eustacian tube dysfunction, can consider using flonase      No visits with results within 1 Month(s) from this visit.   Latest known visit with results is:   Hospital Outpatient Visit on 08/24/2020   Component Date Value   • Sodium 08/24/2020 139    • Potassium 08/24/2020 4.7    • Chloride 08/24/2020 106    • Co2 08/24/2020 23    • Anion Gap 08/24/2020 10.0    • Glucose 08/24/2020 101*   • Bun 08/24/2020 17    • Creatinine 08/24/2020 0.94    • Calcium 08/24/2020 10.4    • AST(SGOT) 08/24/2020 16    • ALT(SGPT) 08/24/2020 18    • Alkaline Phosphatase 08/24/2020 116*   • Total Bilirubin 08/24/2020 0.4    • Albumin 08/24/2020 4.5    • Total Protein 08/24/2020 7.0    • Globulin 08/24/2020 2.5    • A-G Ratio 08/24/2020 1.8    • GFR If  08/24/2020 >60    • GFR If Non  Ameri* 08/24/2020 >60          clinical course has been stable    Past Medical History:   Diagnosis Date  "  • Allergic rhinitis, cause unspecified    • Anxiety state, unspecified    • Degeneration of lumbar or lumbosacral intervertebral disc    • Depressive disorder, not elsewhere classified    • Esophageal reflux    • Essential hypertension, malignant    • Generalized anxiety disorder 11/10/2009   • History of kidney stones    • Hypertrophy of prostate with urinary obstruction and other lower urinary tract symptoms (LUTS)    • Ingrowing nail     RT GREAT TOE   • Insomnia, unspecified    • Right bundle branch block and left anterior fascicular block    • SOB (shortness of breath) 10/24/2014   • Unspecified deficiency anemia    • Unspecified hemorrhagic conditions     nose bleeds       Past Surgical History:   Procedure Laterality Date   • PB OPEN FIX INTER/SUBTROCH FX,IMPLNT Right 6/7/2019    Procedure: INSERTION, INTRAMEDULLARY LOULOU, FEMUR, PROXIMAL;  Surgeon: Kendrick Sheldon M.D.;  Location: SURGERY [facility];  Service: Orthopedics   • PARATHYROID EXPLORATION  7/25/2012    Performed by GURVINDER KENDALL at SURGERY SAME DAY [facility] ORS   • LUMBAR LAMINECTOMY DISKECTOMY  2009   • CERVICAL DISK AND FUSION ANTERIOR  2009   • INGUINAL HERNIA REPAIR      left   • LITHOTRIPSY      \"several years ago\"       Family History   Problem Relation Age of Onset   • Cancer Mother         LIVER   • Hypertension Mother    • No Known Problems Father    • No Known Problems Sister    • No Known Problems Brother        Xanax [alprazolam]    Current Outpatient Medications Ordered in Epic   Medication Sig Dispense Refill   • Ascorbic Acid (VITAMIN C) 1000 MG Tab Take  by mouth.     • cetirizine (ZYRTEC) 10 MG Tab Take 1 Tab by mouth every day. 90 Tab 3   • lisinopril (PRINIVIL) 40 MG tablet TAKE ONE TABLET BY MOUTH EVERY DAY 90 Tab 1   • sertraline (ZOLOFT) 100 MG Tab Take 2 Tabs by mouth every day. 180 Tab 3   • terazosin (HYTRIN) 5 MG Cap Take 1 Cap by mouth every day. 90 Cap 3   • Multiple Vitamins-Minerals (CENTRUM SILVER ADULT 50+ " "PO) Take 1 Tab by mouth every day.       No current Psychiatric-ordered facility-administered medications on file.        Constitutional ROS: No unexpected change in weight, No weakness, No unexplained fevers, sweats, or chills  Ear ROS: positive per HPI  Pulmonary ROS: No chronic cough, sputum, or hemoptysis, No shortness of breath, No recent change in breathing  Cardiovascular ROS: positive per HPI  Musculoskeletal/Extremities ROS: No clubbing, No peripheral edema, No pain, redness or swelling on the joints  Neurologic ROS: Normal development, No seizures, No weakness    Physical exam:  /68   Pulse (!) 106   Temp 36.1 °C (96.9 °F) (Temporal)   Resp (!) 22   Ht 1.676 m (5' 6\")   Wt 86.2 kg (190 lb)   SpO2 95%   BMI 30.67 kg/m²   General Appearance: pleasant elderly male, alert, no distress, well groomed  Skin: Skin color, texture, turgor normal. No rashes or lesions.  Ears: External ears normal. Canals clear. TM's normal.  Lungs: negative findings: normal respiratory rate and rhythm, lungs clear to auscultation  Heart: negative. RRR without murmur, gallop, or rubs.  No ectopy.  Abdomen: Abdomen soft, non-tender. BS normal. No masses,  No organomegaly  Musculoskeletal: negative findings: no evidence of joint instability, no evidence of muscle atrophy, no deformities present  Neurologic: intact, CN 2-12 grossly intact    Medical decision making/discussion:     Consider use of flonase    Referral to cardiology    Labs today    ER precautions discussed    Follow up in 3 weeks, sooner if needed    Celio was seen today for ear fullness and palpitations.    Diagnoses and all orders for this visit:    Sensation of fullness in both ears    Palpitations  -     REFERRAL TO CARDIOLOGY  -     EC-ECHOCARDIOGRAM COMPLETE W/O CONT; Future        Return in about 3 weeks (around 1/20/2021) for Follow-up, Discuss Labs.        Please note that this dictation was created using voice recognition software. I have made every " reasonable attempt to correct obvious errors, but I expect that there are errors of grammar and possibly content that I did not discover before finalizing the note.

## 2020-12-30 NOTE — ASSESSMENT & PLAN NOTE
Pt notes to have bilateral ear fullness, denies pain, drainage or any other associated URI s/sx  Has had cerumen impaction in the past   Upon PE, bilateral EACs clear, TMs WNL, no signs of infection  Discussed likely eustacian tube dysfunction, can consider using flonase

## 2021-01-11 DIAGNOSIS — Z23 NEED FOR VACCINATION: ICD-10-CM

## 2021-01-20 ENCOUNTER — OFFICE VISIT (OUTPATIENT)
Dept: MEDICAL GROUP | Facility: PHYSICIAN GROUP | Age: 76
End: 2021-01-20
Payer: COMMERCIAL

## 2021-01-20 ENCOUNTER — HOSPITAL ENCOUNTER (OUTPATIENT)
Dept: LAB | Facility: MEDICAL CENTER | Age: 76
End: 2021-01-20
Attending: NURSE PRACTITIONER
Payer: COMMERCIAL

## 2021-01-20 VITALS
DIASTOLIC BLOOD PRESSURE: 90 MMHG | WEIGHT: 185 LBS | HEIGHT: 66 IN | OXYGEN SATURATION: 96 % | BODY MASS INDEX: 29.73 KG/M2 | RESPIRATION RATE: 16 BRPM | SYSTOLIC BLOOD PRESSURE: 136 MMHG | HEART RATE: 91 BPM | TEMPERATURE: 98.3 F

## 2021-01-20 DIAGNOSIS — E83.52 HYPERCALCEMIA: ICD-10-CM

## 2021-01-20 DIAGNOSIS — E21.3 HYPERPARATHYROIDISM (HCC): ICD-10-CM

## 2021-01-20 LAB
CA-I SERPL-SCNC: 1.4 MMOL/L (ref 1.1–1.3)
PTH-INTACT SERPL-MCNC: 92 PG/ML (ref 14–72)

## 2021-01-20 PROCEDURE — 83970 ASSAY OF PARATHORMONE: CPT

## 2021-01-20 PROCEDURE — 36415 COLL VENOUS BLD VENIPUNCTURE: CPT

## 2021-01-20 PROCEDURE — 82330 ASSAY OF CALCIUM: CPT

## 2021-01-20 PROCEDURE — 99214 OFFICE O/P EST MOD 30 MIN: CPT | Performed by: NURSE PRACTITIONER

## 2021-01-20 ASSESSMENT — PATIENT HEALTH QUESTIONNAIRE - PHQ9
6. FEELING BAD ABOUT YOURSELF - OR THAT YOU ARE A FAILURE OR HAVE LET YOURSELF OR YOUR FAMILY DOWN: NOT AL ALL
SUM OF ALL RESPONSES TO PHQ9 QUESTIONS 1 AND 2: 0
3. TROUBLE FALLING OR STAYING ASLEEP OR SLEEPING TOO MUCH: NOT AT ALL
1. LITTLE INTEREST OR PLEASURE IN DOING THINGS: NOT AT ALL
SUM OF ALL RESPONSES TO PHQ QUESTIONS 1-9: 0
8. MOVING OR SPEAKING SO SLOWLY THAT OTHER PEOPLE COULD HAVE NOTICED. OR THE OPPOSITE, BEING SO FIGETY OR RESTLESS THAT YOU HAVE BEEN MOVING AROUND A LOT MORE THAN USUAL: NOT AT ALL
2. FEELING DOWN, DEPRESSED, IRRITABLE, OR HOPELESS: NOT AT ALL
7. TROUBLE CONCENTRATING ON THINGS, SUCH AS READING THE NEWSPAPER OR WATCHING TELEVISION: NOT AT ALL
5. POOR APPETITE OR OVEREATING: NOT AT ALL
9. THOUGHTS THAT YOU WOULD BE BETTER OFF DEAD, OR OF HURTING YOURSELF: NOT AT ALL
4. FEELING TIRED OR HAVING LITTLE ENERGY: NOT AT ALL

## 2021-01-20 ASSESSMENT — FIBROSIS 4 INDEX: FIB4 SCORE: 2.89

## 2021-01-20 NOTE — ASSESSMENT & PLAN NOTE
Very pleasant 75-year-old male patient presents today to review labs, these were done a few weeks ago  He was again found to have elevated calcium level, 11.6, not taking a calcium supplement  He does however have a history of hyperparathyroidism and tells me that he does not follow with an endocrinologist  He does deny symptoms associated with this  Repeat labs have been ordered, he will have them done today  He agrees to referral to endocrinology

## 2021-01-20 NOTE — PROGRESS NOTES
Chief Complaint   Patient presents with   • Hypertension       HISTORY OF PRESENT ILLNESS: Patient is a 75 y.o. male established patient who presents today to review labs    Hypercalcemia  Very pleasant 75-year-old male patient presents today to review labs, these were done a few weeks ago  He was again found to have elevated calcium level, 11.6, not taking a calcium supplement  He does however have a history of hyperparathyroidism and tells me that he does not follow with an endocrinologist  He does deny symptoms associated with this  Repeat labs have been ordered, he will have them done today  He agrees to referral to endocrinology      Patient Active Problem List    Diagnosis Date Noted   • Closed right hip fracture, initial encounter ([facility]) 06/06/2019     Priority: High   • Benign prostatic hyperplasia without lower urinary tract symptoms 06/06/2019     Priority: Medium   • HTN (hypertension) 06/30/2010     Priority: Low   • Sensation of fullness in both ears 12/30/2020   • Palpitations 12/30/2020   • Chronic pain of both knees 08/24/2020   • Obesity (BMI 30-39.9) 11/06/2019   • RBBB (right bundle branch block with left anterior fascicular block) 06/06/2019   • Vitamin D insufficiency 06/07/2018   • Cough 06/07/2018   • Health care maintenance 06/12/2017   • Generalized abdominal pain 03/24/2017   • Weight loss 04/08/2016   • Lumbago of lumbar region with sciatica 03/16/2016   • Grief at loss of child 03/16/2016   • Hypercalcemia 03/16/2016   • Gastroesophageal reflux disease with esophagitis 02/01/2016   • Colon polyps 02/01/2016   • Anemia 04/17/2015   • SOB (shortness of breath) 10/24/2014   • Hyperparathyroidism ([facility]) 07/02/2012   • RBBB 06/20/2012   • Kidney stones 03/26/2012   • Seasonal allergies 05/05/2011   • Osteopenia 03/28/2011   • Murmur, cardiac 11/04/2010   • Depression 07/08/2010   • Chronic back pain 07/08/2010   • Generalized anxiety disorder 11/10/2009       Allergies:Xanax  [alprazolam]    Current Outpatient Medications   Medication Sig Dispense Refill   • Ascorbic Acid (VITAMIN C) 1000 MG Tab Take  by mouth.     • cetirizine (ZYRTEC) 10 MG Tab Take 1 Tab by mouth every day. 90 Tab 3   • lisinopril (PRINIVIL) 40 MG tablet TAKE ONE TABLET BY MOUTH EVERY DAY 90 Tab 1   • sertraline (ZOLOFT) 100 MG Tab Take 2 Tabs by mouth every day. 180 Tab 3   • terazosin (HYTRIN) 5 MG Cap Take 1 Cap by mouth every day. 90 Cap 3   • Multiple Vitamins-Minerals (CENTRUM SILVER ADULT 50+ PO) Take 1 Tab by mouth every day.       No current facility-administered medications for this visit.        Social History     Tobacco Use   • Smoking status: Former Smoker     Packs/day: 0.50     Years: 30.00     Pack years: 15.00     Types: Cigarettes     Quit date: 3/27/1987     Years since quittin.8   • Smokeless tobacco: Never Used   Substance Use Topics   • Alcohol use: No     Alcohol/week: 0.0 oz   • Drug use: No       Family Status   Relation Name Status   • Mo   at age 67   • Fa   at age 89        OLD AGE   • Sis  Alive   • Bro  Alive     Family History   Problem Relation Age of Onset   • Cancer Mother         LIVER   • Hypertension Mother    • No Known Problems Father    • No Known Problems Sister    • No Known Problems Brother        Review of Systems:   Constitutional: Negative for fever, chills, weight loss and malaise/fatigue.   HENT: Negative for ear pain, nosebleeds, congestion, sore throat and neck pain.    Eyes: Negative for blurred vision.   Respiratory: Negative for cough, sputum production, shortness of breath and wheezing.    Cardiovascular: Negative for chest pain, palpitations, orthopnea and leg swelling.   Gastrointestinal: Negative for heartburn, nausea, vomiting and abdominal pain.   Musculoskeletal: Negative for myalgias, back pain and joint pain.   Skin: Negative for rash and itching.   Neurological: Negative for dizziness, tingling, tremors, sensory change, focal weakness  "and headaches.   Endo/Heme/Allergies: positive per HPI   All other systems reviewed and are negative except as in HPI.    Exam:  /90   Pulse 91   Temp 36.8 °C (98.3 °F) (Temporal)   Resp 16   Ht 1.676 m (5' 6\")   Wt 83.9 kg (185 lb)   SpO2 96%   General:  Well nourished, well developed male in NAD  Head: is grossly normal.  HEENT: eyes clear, conjunctiva normal, PERRLA,TMs normal; bilaterally  Pulmonary: Clear to ausculation. Normal effort. No rales, ronchi, or wheezing.  Extremities: no clubbing, cyanosis, or edema.  Psych/mental: no depression, anxiety, hallucinations    Medical decision-making and discussion:    Discussed various causes of elevated calcium level, in the setting of his history of hyperparathyroidism he does agree to referral to endocrinology.  We will repeat labs today.  He is to otherwise take all of his medications as prescribed and call for refills as needed.  We will plan on follow-up after repeat labs and visit with endocrinology        Assessment/Plan:  Celio was seen today for hypertension.    Diagnoses and all orders for this visit:    Hyperparathyroidism (HCC)  -     REFERRAL TO ENDOCRINOLOGY  -     PTH WITH IONIZED CALCIUM; Future    Hypercalcemia  -     REFERRAL TO ENDOCRINOLOGY  -     PTH WITH IONIZED CALCIUM; Future         Return if symptoms worsen or fail to improve, for Follow-up.    Please note that this dictation was created using voice recognition software. I have made every reasonable attempt to correct obvious errors, but I expect that there are errors of grammar and possibly content that I did not discover before finalizing the note.    "

## 2021-07-14 DIAGNOSIS — I10 ESSENTIAL HYPERTENSION: ICD-10-CM

## 2021-07-14 DIAGNOSIS — J30.2 SEASONAL ALLERGIES: ICD-10-CM

## 2021-07-16 NOTE — TELEPHONE ENCOUNTER
Received request via: Pharmacy    Was the patient seen in the last year in this department? Yes    Does the patient have an active prescription (recently filled or refills available) for medication(s) requested? No       Last Labs:01/20/2021  Last Office Visit:01/20/2021

## 2021-07-19 RX ORDER — CETIRIZINE HYDROCHLORIDE 10 MG/1
TABLET ORAL
Qty: 90 TABLET | Refills: 3 | Status: SHIPPED | OUTPATIENT
Start: 2021-07-19

## 2021-07-19 RX ORDER — LISINOPRIL 40 MG/1
TABLET ORAL
Qty: 90 TABLET | Refills: 1 | Status: SHIPPED | OUTPATIENT
Start: 2021-07-19

## 2021-09-07 ENCOUNTER — OFFICE VISIT (OUTPATIENT)
Dept: MEDICAL GROUP | Facility: PHYSICIAN GROUP | Age: 76
End: 2021-09-07
Payer: COMMERCIAL

## 2021-09-07 ENCOUNTER — HOSPITAL ENCOUNTER (OUTPATIENT)
Facility: MEDICAL CENTER | Age: 76
End: 2021-09-07
Attending: NURSE PRACTITIONER
Payer: COMMERCIAL

## 2021-09-07 VITALS
OXYGEN SATURATION: 91 % | DIASTOLIC BLOOD PRESSURE: 82 MMHG | RESPIRATION RATE: 22 BRPM | HEART RATE: 66 BPM | HEIGHT: 66 IN | WEIGHT: 176 LBS | BODY MASS INDEX: 28.28 KG/M2 | TEMPERATURE: 98.9 F | SYSTOLIC BLOOD PRESSURE: 116 MMHG

## 2021-09-07 DIAGNOSIS — R52 BODY ACHES: ICD-10-CM

## 2021-09-07 DIAGNOSIS — R53.1 WEAKNESS: ICD-10-CM

## 2021-09-07 DIAGNOSIS — R05.9 COUGH: ICD-10-CM

## 2021-09-07 PROCEDURE — 99214 OFFICE O/P EST MOD 30 MIN: CPT | Performed by: NURSE PRACTITIONER

## 2021-09-07 PROCEDURE — U0005 INFEC AGEN DETEC AMPLI PROBE: HCPCS

## 2021-09-07 PROCEDURE — U0003 INFECTIOUS AGENT DETECTION BY NUCLEIC ACID (DNA OR RNA); SEVERE ACUTE RESPIRATORY SYNDROME CORONAVIRUS 2 (SARS-COV-2) (CORONAVIRUS DISEASE [COVID-19]), AMPLIFIED PROBE TECHNIQUE, MAKING USE OF HIGH THROUGHPUT TECHNOLOGIES AS DESCRIBED BY CMS-2020-01-R: HCPCS

## 2021-09-07 ASSESSMENT — FIBROSIS 4 INDEX: FIB4 SCORE: 2.93

## 2021-09-08 ENCOUNTER — PATIENT MESSAGE (OUTPATIENT)
Dept: MEDICAL GROUP | Facility: PHYSICIAN GROUP | Age: 76
End: 2021-09-08

## 2021-09-08 ENCOUNTER — TELEPHONE (OUTPATIENT)
Dept: MEDICAL GROUP | Facility: PHYSICIAN GROUP | Age: 76
End: 2021-09-08

## 2021-09-08 DIAGNOSIS — R53.1 WEAKNESS: ICD-10-CM

## 2021-09-08 DIAGNOSIS — R52 BODY ACHES: ICD-10-CM

## 2021-09-08 LAB
COVID ORDER STATUS COVID19: NORMAL
SARS-COV-2 RNA RESP QL NAA+PROBE: DETECTED
SPECIMEN SOURCE: ABNORMAL

## 2021-09-08 NOTE — PROGRESS NOTES
Pt notified of results, pt's granddaughter reports sats at 83%, advised to take pt to ER, no longer qualifies for monoclonal antibodies

## 2021-09-08 NOTE — TELEPHONE ENCOUNTER
Please advised pt to read positive letter in mychart--does he want to get antibodies? Also how is he doing?

## 2021-09-08 NOTE — TELEPHONE ENCOUNTER
I have called and spoke with spouse to follow up on this. Per Ms. Lois stated patient has been sleeping all day but seems like he might be doing better. Per spouse patient 02 is currently at 83.     I have informed them they need to proceed to ER. Aly will proceed to [place] in [place].     I have called and spoke with Charge nurse Seema at [facility] to inform them patient will be coming in POV, COIVD positive with hypoxia

## 2021-09-08 NOTE — PROGRESS NOTES
Chief Complaint   Patient presents with   • Weakness   • Tired       HISTORY OF PRESENT ILLNESS: Patient is a 76 y.o. male established patient who presents today to discuss new symptoms that started 4 days ago    Weakness  This started about 4 weeks ago, he has symptoms of weakness, body aches, cough but does deny fever  His wife accompanies him today who states he is also displaying some confusion  He has been out and about especially at the [facility], he is not vaccinated against COVID-19  He does understand that he will do COVID-19 swab today and is to remain quarantined until results are reviewed  We had long discussion regarding supportive measures including rest, fluids, over-the-counter cough and cold medications, he was given very strict ER precautions should any of his symptoms worsen or become severe specifically with breathing  His granddaughter accompanies him today and she states that his O2 saturations have been in the high 80s however his saturations today in office were up to 91%  Had long discussion regarding the vaccine, much information was given, reassurance provided  Did recommend that if he does get Covid and once he recovers he should seriously consider getting the COVID-19 vaccine  We also discussed possibly getting a monoclonal antibodies if his test is positive  , He states he will think about this  He will be notified of results and further actions if needed      Patient Active Problem List    Diagnosis Date Noted   • Body aches 09/08/2021   • Weakness 09/07/2021   • Sensation of fullness in both ears 12/30/2020   • Palpitations 12/30/2020   • Chronic pain of both knees 08/24/2020   • Obesity (BMI 30-39.9) 11/06/2019   • RBBB (right bundle branch block with left anterior fascicular block) 06/06/2019   • Closed right hip fracture, initial encounter ([facility]) 06/06/2019   • Benign prostatic hyperplasia without lower urinary tract symptoms 06/06/2019   • Vitamin D insufficiency 06/07/2018   • Cough  2018   • Health care maintenance 2017   • Generalized abdominal pain 2017   • Weight loss 2016   • Lumbago of lumbar region with sciatica 2016   • Grief at loss of child 2016   • Hypercalcemia 2016   • Gastroesophageal reflux disease with esophagitis 2016   • Colon polyps 2016   • Anemia 2015   • SOB (shortness of breath) 10/24/2014   • Hyperparathyroidism (HCC) 2012   • RBBB 2012   • Kidney stones 2012   • Seasonal allergies 2011   • Osteopenia 2011   • Murmur, cardiac 2010   • Depression 2010   • Chronic back pain 2010   • HTN (hypertension) 2010   • Generalized anxiety disorder 11/10/2009       Allergies:Xanax [alprazolam]    Current Outpatient Medications   Medication Sig Dispense Refill   • lisinopril (PRINIVIL) 40 MG tablet TAKE ONE TABLET BY MOUTH EVERY DAY 90 tablet 1   • cetirizine (ZYRTEC) 10 MG Tab TAKE ONE TABLET BY MOUTH EVERY DAY 90 tablet 3   • sertraline (ZOLOFT) 100 MG Tab TAKE TWO TABLETS BY MOUTH EVERY  tablet 3   • Ascorbic Acid (VITAMIN C) 1000 MG Tab Take  by mouth.     • terazosin (HYTRIN) 5 MG Cap Take 1 Cap by mouth every day. 90 Cap 3   • Multiple Vitamins-Minerals (CENTRUM SILVER ADULT 50+ PO) Take 1 Tab by mouth every day.       No current facility-administered medications for this visit.       Social History     Tobacco Use   • Smoking status: Former Smoker     Packs/day: 0.50     Years: 30.00     Pack years: 15.00     Types: Cigarettes     Quit date: 3/27/1987     Years since quittin.4   • Smokeless tobacco: Never Used   Vaping Use   • Vaping Use: Never used   Substance Use Topics   • Alcohol use: No     Alcohol/week: 0.0 oz   • Drug use: No       Family Status   Relation Name Status   • Mo   at age 67   • Fa   at age 89        OLD AGE   • Sis  Alive   • Bro  Alive     Family History   Problem Relation Age of Onset   • Cancer Mother         LIVER  "  • Hypertension Mother    • No Known Problems Father    • No Known Problems Sister    • No Known Problems Brother        Review of Systems:   Constitutional: Negative for fever, chills, weight loss and malaise/fatigue. positive for weakness   Respiratory: Positive for cough  Cardiovascular: Negative for chest pain, palpitations, orthopnea and leg swelling.   Gastrointestinal: Negative for heartburn, nausea, vomiting and abdominal pain.   Genitourinary/Renal: Negative for dysuria, urgency and frequency.   Musculoskeletal: Positive for body aches  Skin: Negative for rash and itching.   Neurological: Negative for dizziness, tingling, tremors, sensory change, focal weakness and headaches.       Exam:  /82   Pulse 66   Temp 37.2 °C (98.9 °F) (Temporal)   Resp (!) 22   Ht 1.676 m (5' 6\")   Wt 79.8 kg (176 lb)   SpO2 91%   General:  Well nourished, well developed male in NAD  Skin: warm, dry, intact, no evidence of rash or concerning lesions  Head: is grossly normal.  HEENT: eyes clear, conjunctiva normal, PERRLA  Pulmonary: Clear to ausculation. Normal effort. No rales, ronchi, or wheezing.  Cardiovascular: Regular rate and rhythm without murmur. Carotid and radial pulses are intact and equal bilaterally.  Abdomen: soft, non-tender, positive bowel sounds  Musculoskeletal: no clubbing, cyanosis, or edema.  Psych/mental: no depression, anxiety, hallucinations  Neuro: alert, intact, CN 2-12 grossly intact    Medical decision-making and discussion:    As mentioned above, will get Covid swab, patient will be notified of results and further actions if needed.  Discussed the importance of supportive care and ER precautions should symptoms worsen or become severe.  He does understand that he is to be under quarantine until results are reviewed, family that accompanied him today including wife and granddaughter also advised to quarantine since they have had close contact with patient.        Assessment/Plan:  Celio" was seen today for weakness and tired.    Diagnoses and all orders for this visit:    Weakness  -     COVID/SARS CoV-2; Future  -      Enhanced Droplet, Contact, and Eye Protection    Body aches  -     COVID/SARS CoV-2; Future  -      Enhanced Droplet, Contact, and Eye Protection    Cough         Return if symptoms worsen or fail to improve, for Follow-up.    Please note that this dictation was created using voice recognition software. I have made every reasonable attempt to correct obvious errors, but I expect that there are errors of grammar and possibly content that I did not discover before finalizing the note.

## 2021-09-08 NOTE — ASSESSMENT & PLAN NOTE
This started about 4 weeks ago, he has symptoms of weakness, body aches, cough but does deny fever  His wife accompanies him today who states he is also displaying some confusion  He has been out and about especially at the [facility], he is not vaccinated against COVID-19  He does understand that he will do COVID-19 swab today and is to remain quarantined until results are reviewed  We had long discussion regarding supportive measures including rest, fluids, over-the-counter cough and cold medications, he was given very strict ER precautions should any of his symptoms worsen or become severe specifically with breathing  His granddaughter accompanies him today and she states that his O2 saturations have been in the high 80s however his saturations today in office were up to 91%  Had long discussion regarding the vaccine, much information was given, reassurance provided  Did recommend that if he does get Covid and once he recovers he should seriously consider getting the COVID-19 vaccine  We also discussed possibly getting a monoclonal antibodies if his test is positive  , He states he will think about this  He will be notified of results and further actions if needed

## 2024-04-29 NOTE — PATIENT INSTRUCTIONS
ANTICOAGULATION THERAPY EDUCATION   PATIENT  INSTRUCTION    Your Guide to Using Warfarin:  Please refer to this handout for questions regarding your medication, Coumadin/Warfarin. This handout includes information on dosing, blood testing, possible side effects of Coumadin/Warfarin, using other medications, dietary guidelines and safety concerns while on anticoagulation therapy.    Please contact your primary care physician and/or seek appropriate medical care if you experience:  A serious fall  Increased menstrual bleeding   An injury to your head  Notice a different color urine or stool   Increased bleeding with teeth brushing   If you have any other unusual bruising   Have bleeding from your nose or a cut that doesn't stop bleeding         Call your physician immediately if you notice any signs and symptoms of a blood clot such as:  Sudden weakness in any limb  Dizziness or faintness   Numbness or tingling anywhere  New shortness of breath or chest pain   Sudden onset of slurred speech or inability to speak  New pain, swelling, redness or heat in any extremity   Visual changes or loss of sight in either eye         Other factors that may affect your anticoagulation therapy include:  Fever  Stress   Diarrhea  Changes in exercise/activity level   Vomiting         While on Anticoagulation therapy avoid:  Pregnancy, using birth control and hormone replacement therapy    Body piercing or tattoos      Please inform family members and other health care providers that you are on anticoagulation therapy. Make sure to carry an up-to-date medication list. You can also wear a medical alert bracelet to identify that you are on anticoagulation therapy.    If you are utilizing an injectable anticoagulation medication you should be aware of how and where the medication should be injected and how to appropriately dispose of the injection needles (sharps).    Additional patient education materials provided to you:  Important  Continue with care through [facility].  Next Medicare Annual Wellness Visit is due in one year.    Continue care with specialists you are seeing for your chronic problems.    Attached is some preventative information for you to read.          Fall Prevention and Home Safety  Falls cause injuries and can affect all age groups. It is possible to prevent falls.   HOW TO PREVENT FALLS  · Wear shoes with rubber soles that do not have an opening for your toes.   · Keep the inside and outside of your house well lit.   · Use night lights throughout your home.   · Remove clutter from floors.   · Clean up floor spills.   · Remove throw rugs or fasten them to the floor with carpet tape.   · Do not place electrical cords across pathways.   · Put grab bars by your tub, shower, and toilet. Do not use towel bars as grab bars.   · Put handrails on both sides of the stairway. Fix loose handrails.   · Do not climb on stools or stepladders, if possible.   · Do not wax your floors.   · Repair uneven or unsafe sidewalks, walkways, or stairs.   · Keep items you use a lot within reach.   · Be aware of pets.   · Keep emergency numbers next to the telephone.   · Put smoke detectors in your home and near bedrooms.   Ask your doctor what other things you can do to prevent falls.  Document Released: 10/14/2010 Document Revised: 06/18/2013 Document Reviewed: 03/19/2013  ExitCare® Patient Information ©2013 [employer], [facility].    Exercise to Stay Healthy      Exercise helps you become and stay healthy.  EXERCISE IDEAS AND TIPS  Choose exercises that:  · You enjoy.   · Fit into your day.   You do not need to exercise really hard to be healthy. You can do exercises at a slow or medium level and stay healthy. You can:  · Stretch before and after working out.   · Try yoga, Pilates, or dorina chi.   · Lift weights.   · Walk fast, swim, jog, run, climb stairs, bicycle, dance, or rollerskate.   · Take aerobic classes.   Exercises that burn about  150 calories:  · Running 1 ½ miles in 15 minutes.   · Playing volleyball for 45 to 60 minutes.   · Washing and waxing a car for 45 to 60 minutes.   · Playing touch football for 45 minutes.   · Walking 1 ¾ miles in 35 minutes.   · Pushing a stroller 1 ½ miles in 30 minutes.   · Playing basketball for 30 minutes.   · Raking leaves for 30 minutes.   · Bicycling 5 miles in 30 minutes.   · Walking 2 miles in 30 minutes.   · Dancing for 30 minutes.   · Shoveling snow for 15 minutes.   · Swimming laps for 20 minutes.   · Walking up stairs for 15 minutes.   · Bicycling 4 miles in 15 minutes.   · Gardening for 30 to 45 minutes.   · Jumping rope for 15 minutes.   · Washing windows or floors for 45 to 60 minutes.     One suggestion is to start walking for 10 minutes after each meal.  This will help with digestion and help you to metabolize your meal.       For Weight Loss -   Recommend portion sizes with more fruits/vegetables/high fiber foods.  Stay away from white bread/pastas/white rice/white potatoes.  Increase Fluid intake to 6-8 glasses of water daily.   Eliminate soda's (diet and regular) from your fluid intake.      Document Released: 01/20/2012 Document Revised: 03/11/2013 Document Reviewed: 01/20/2012  ExitCare® Patient Information ©2013 [employer], [employer].    Fat and Cholesterol Control Diet  Cholesterol is a wax-like substance. It comes from your liver and is found in certain foods. There is good (HDL) and bad (LDL) cholesterol. Too much cholesterol in your blood can affect your heart. Certain foods can lower or raise your cholesterol. Eat foods that are low in cholesterol.  Saturated and trans fats are bad fats found in foods that will raise your cholesterol. Do not eat foods that are high in saturated and trans fats.  FOODS HIGHER IN SATURATED AND TRANS FATS  · Dairy products, such as whole milk, eggs, cheese, cream, and butter.   · Fatty meats, such as hot dogs, sausage, and salami.   · Fried foods.   · Trans fats  Facts about your Coumadin (color handout)  Vitamin K Food List  Travel Fitness Tips for Patients on Coumadin  Prevention and Treatment of Nosebleeds  When To Call Your Physician  Potential and Documented Interaction of Herbs with Coumadin/Warfarin  Lab hours for [facility]   which are found in margarine and pre-made cookies, crackers, and baked goods.   · Tropical oils, such as coconut and palm oils.   Read package labels at the store. Do not buy products that use saturated or trans fats or hydrogenated oils. Find foods labeled:  · Low-fat.   · Low-saturated fat.   · Trans-fat-free.   · Low-cholesterol.   FOODS LOWER IN CHOLESTEROL  ·  Fruit.   · Vegetables.   · Beans, peas, and lentils.   · Fish.   · Lean meat, such as chicken (without skin) or ground turkey.   · Grains, such as barley, rice, couscous, bulgur wheat, and pasta.   · Heart-healthy tub margarine.   PREPARING YOUR FOOD  · Broil, bake, steam, or roast foods. Do not olsen food.   · Use non-stick cooking sprays.   · Use lemon or herbs to flavor food instead of using butter or stick margarine.   · Use nonfat yogurt, salsa, or low-fat dressings for salads.   Document Released: 06/18/2013 Document Reviewed: 06/18/2013  ExitCare® Patient Information ©2013 [employer].    Recommend annual flu vaccine.  Next due in Fall, 2015.  If you decide not to have the flu vaccine, recommend good handwashing and staying out of crowds during flu season.

## (undated) DEVICE — SUTURE GENERAL

## (undated) DEVICE — DRAPE U ORTHOPEDIC - (10/BX)

## (undated) DEVICE — KIT ANESTHESIA W/CIRCUIT & 3/LT BAG W/FILTER (20EA/CA)

## (undated) DEVICE — KIT ROOM DECONTAMINATION

## (undated) DEVICE — PACK MAJOR ORTHO - (2EA/CA)

## (undated) DEVICE — TUBING CLEARLINK DUO-VENT - C-FLO (48EA/CA)

## (undated) DEVICE — SENSOR SPO2 NEO LNCS ADHESIVE (20/BX) SEE USER NOTES

## (undated) DEVICE — DRAPE STRLE REG TOWEL 18X24 - (10/BX 4BX/CA)"

## (undated) DEVICE — ELECTRODE DUAL RETURN W/ CORD - (50/PK)

## (undated) DEVICE — TRAY CATHETER FOLEY URINE METER W/STATLOCK 350ML (10EA/CA)

## (undated) DEVICE — SUTURE 2-0 VICRYL PLUS CT-1 36 (36PK/BX)"

## (undated) DEVICE — PROTECTOR ULNA NERVE - (36PR/CA)

## (undated) DEVICE — DRAPE SURGICAL U 77X120 - (10/CA)

## (undated) DEVICE — MASK ANESTHESIA ADULT  - (100/CA)

## (undated) DEVICE — GOWN WARMING STANDARD FLEX - (30/CA)

## (undated) DEVICE — ROD REAMING STERILE WITH BALL TIP 2.5MM 950MM

## (undated) DEVICE — DRAPE 36X28IN RAD CARM BND BG - (25/CA) O

## (undated) DEVICE — ELECTRODE 850 FOAM ADHESIVE - HYDROGEL RADIOTRNSPRNT (50/PK)

## (undated) DEVICE — SET EXTENSION WITH 2 PORTS (48EA/CA) ***PART #2C8610 IS A SUBSTITUTE*****

## (undated) DEVICE — SUCTION INSTRUMENT YANKAUER BULBOUS TIP W/O VENT (50EA/CA)

## (undated) DEVICE — DRAPE SURG STERI-DRAPE 7X11OD - (40EA/CA)

## (undated) DEVICE — DRESSING LEUKOMED STERILE 11.75X4IN - (50/CA)

## (undated) DEVICE — SUTURE 0 VICRYL PLUS CT-1 - 36 INCH (36/BX)

## (undated) DEVICE — CHLORAPREP 26 ML APPLICATOR - ORANGE TINT(25/CA)

## (undated) DEVICE — SET LEADWIRE 5 LEAD BEDSIDE DISPOSABLE ECG (1SET OF 5/EA)

## (undated) DEVICE — GLOVE BIOGEL INDICATOR SZ 7.5 SURGICAL PF LTX - (50PR/BX 4BX/CA)

## (undated) DEVICE — DRAPE LARGE 3 QUARTER - (20/CA)

## (undated) DEVICE — DRAPE C-ARM LARGE 41IN X 74 IN - (10/BX 2BX/CA)

## (undated) DEVICE — NEPTUNE 4 PORT MANIFOLD - (20/PK)

## (undated) DEVICE — LACTATED RINGERS INJ 1000 ML - (14EA/CA 60CA/PF)

## (undated) DEVICE — SLEEVE, VASO, THIGH, MED

## (undated) DEVICE — CANISTER SUCTION 3000ML MECHANICAL FILTER AUTO SHUTOFF MEDI-VAC NONSTERILE LF DISP  (40EA/CA)

## (undated) DEVICE — HEAD HOLDER JUNIOR/ADULT

## (undated) DEVICE — DRAPE C ARMOR (12EA/CA)

## (undated) DEVICE — DRAPE IOBAN II INCISE 23X17 - (10EA/BX 4BX/CA)

## (undated) DEVICE — SPONGE GAUZE STER 4X4 8-PL - (2/PK 50PK/BX 12BX/CS)

## (undated) DEVICE — WRAP COBAN SELF-ADHERENT 6 IN X  5YDS STERILE TAN (12/CA)

## (undated) DEVICE — BOVIE BLADE COATED - (50/PK)